# Patient Record
Sex: MALE
[De-identification: names, ages, dates, MRNs, and addresses within clinical notes are randomized per-mention and may not be internally consistent; named-entity substitution may affect disease eponyms.]

---

## 2018-08-15 ENCOUNTER — HOSPITAL ENCOUNTER (INPATIENT)
Dept: HOSPITAL 14 - H.ER | Age: 78
LOS: 5 days | Discharge: HOME | DRG: 603 | End: 2018-08-20
Attending: FAMILY MEDICINE | Admitting: FAMILY MEDICINE
Payer: MEDICARE

## 2018-08-15 DIAGNOSIS — Z86.73: ICD-10-CM

## 2018-08-15 DIAGNOSIS — Z85.46: ICD-10-CM

## 2018-08-15 DIAGNOSIS — F02.81: ICD-10-CM

## 2018-08-15 DIAGNOSIS — G30.9: ICD-10-CM

## 2018-08-15 DIAGNOSIS — I10: ICD-10-CM

## 2018-08-15 DIAGNOSIS — E11.65: ICD-10-CM

## 2018-08-15 DIAGNOSIS — E78.00: ICD-10-CM

## 2018-08-15 DIAGNOSIS — I47.1: ICD-10-CM

## 2018-08-15 DIAGNOSIS — E78.5: ICD-10-CM

## 2018-08-15 DIAGNOSIS — Z87.891: ICD-10-CM

## 2018-08-15 DIAGNOSIS — L03.114: Primary | ICD-10-CM

## 2018-08-15 LAB
APTT BLD: 33 SECONDS (ref 25.6–37.1)
BASE EXCESS BLDV CALC-SCNC: 0.3 MMOL/L (ref 0–2)
BASE EXCESS BLDV CALC-SCNC: 1 MMOL/L (ref 0–2)
BASOPHILS # BLD AUTO: 0 K/UL (ref 0–0.2)
BASOPHILS NFR BLD: 0.5 % (ref 0–2)
BUN SERPL-MCNC: 30 MG/DL (ref 9–20)
CALCIUM SERPL-MCNC: 10.1 MG/DL (ref 8.4–10.2)
EOSINOPHIL # BLD AUTO: 0 K/UL (ref 0–0.7)
EOSINOPHIL NFR BLD: 0.4 % (ref 0–4)
ERYTHROCYTE [DISTWIDTH] IN BLOOD BY AUTOMATED COUNT: 16.2 % (ref 11.5–14.5)
GFR NON-AFRICAN AMERICAN: > 60
HGB BLD-MCNC: 12.2 G/DL (ref 12–18)
INR PPP: 1.2
LYMPHOCYTES # BLD AUTO: 1 K/UL (ref 1–4.3)
LYMPHOCYTES NFR BLD AUTO: 14.1 % (ref 20–40)
MCH RBC QN AUTO: 24.6 PG (ref 27–31)
MCHC RBC AUTO-ENTMCNC: 33 G/DL (ref 33–37)
MCV RBC AUTO: 74.4 FL (ref 80–94)
MONOCYTES # BLD: 0.7 K/UL (ref 0–0.8)
MONOCYTES NFR BLD: 9.9 % (ref 0–10)
NEUTROPHILS # BLD: 5.1 K/UL (ref 1.8–7)
NEUTROPHILS NFR BLD AUTO: 75.1 % (ref 50–75)
NRBC BLD AUTO-RTO: 0.1 % (ref 0–0)
PCO2 BLDV: 42 MMHG (ref 40–60)
PCO2 BLDV: 42 MMHG (ref 40–60)
PH BLDV: 7.39 [PH] (ref 7.32–7.43)
PH BLDV: 7.4 [PH] (ref 7.32–7.43)
PLATELET # BLD: 142 K/UL (ref 130–400)
PMV BLD AUTO: 9.4 FL (ref 7.2–11.7)
PROTHROMBIN TIME: 12.9 SECONDS (ref 9.8–13.1)
RBC # BLD AUTO: 4.98 MIL/UL (ref 4.4–5.9)
URATE SERPL-MCNC: 5.6 MG/DL (ref 3.5–8.5)
VENOUS BLOOD FIO2: 21 %
VENOUS BLOOD FIO2: 21 %
VENOUS BLOOD GAS PO2: 54 MM/HG (ref 30–55)
VENOUS BLOOD GAS PO2: 56 MM/HG (ref 30–55)
WBC # BLD AUTO: 6.8 K/UL (ref 4.8–10.8)

## 2018-08-15 NOTE — ED PDOC
Upper Extremity Pain/Injury


Time Seen by Provider: 08/15/18 17:18


Chief Complaint (Nursing): Upper Extremity Problem/Injury


Chief Complaint (Provider): left arm pain


History Per: Patient, Family (daughter)


History/Exam Limitations: clinical condition (Alzheimer's disease)


Onset/Duration Of Symptoms: Days (x2)


Current Symptoms Are (Timing): Still Present


Additional Complaint(s): 





Burton Burden is an 78 year old male, with a past medical history of advanced 

Alzheimer's, diabetes, HTN and hyperlipidemia, who presents to the emergency 

department accompanied by daughter for a worsening left arm pain onset for x2 

days. Patient's daughter at bedside and states patient was seen by new PMD for 

medication adjustments and shortly after she noticed patient was avoiding using 

his left arm and didn't want it being touch. Daughter states arm has been more 

painful and red for the last x2 days. Patient pulls away with touch but is 

still eating. Daughter denies any fever, chills, behavioral changes, known 

trauma to hand, insect bites or cuts. Patient lives with daughter, Bernadette Burden

, who is also the healthcare proxy. No further medical complaints. 





PMD: Steven Plummer





Past Medical History


Reviewed: Historical Data, Nursing Documentation, Vital Signs


Vital Signs: 


 Last Vital Signs











Temp  99.2 F   08/15/18 17:02


 


Pulse  114 H  08/15/18 17:02


 


Resp  16   08/15/18 17:02


 


BP  188/80 H  08/15/18 17:02


 


Pulse Ox  97   08/15/18 17:02














- Medical History


PMH: Alzheimer's Disease, Dementia, Diabetes, HTN, Hypercholesterolemia, 

Hyperlipidemia


   Denies: Arthritis, Chronic Kidney Disease





- Family History


Family History: States: Unknown Family Hx





- Living Arrangements


Living Arrangements: With Family (daughter)





- Home Medications


Home Medications: 


 Ambulatory Orders











 Medication  Instructions  Recorded


 


Ammonium Lactate 12% [Lac-Hydrin 1 appl TOP DAILY 12/29/16





12% Lotion (225 g)]  


 


Donepezil [Aricept] 10 mg PO DAILY 12/29/16


 


Fenofibrate Nanocrystallized 160 mg PO HS 12/29/16





[Triglide]  


 


Memantine [Namenda] 10 mg PO BID 12/29/16


 


Simvastatin [Zocor] 40 mg PO HS 12/29/16


 


metFORMIN [glucOPHAGE] 500 mg PO DAILY 12/29/16


 


Aspirin [Aspirin Chewable] 81 mg PO DAILY #30 ctb 12/30/16


 


QUEtiapine [SEROquel] 25 mg PO HS 08/15/18














- Allergies


Allergies/Adverse Reactions: 


 Allergies











Allergy/AdvReac Type Severity Reaction Status Date / Time


 


No Known Allergies Allergy   Verified 08/15/18 17:02














Review of Systems


ROS Statement: Except As Marked, All Systems Reviewed And Found Negative


Constitutional: Negative for: Fever, Chills


Musculoskeletal: Positive for: Arm Pain (left arm, redness)


Skin: Negative for: Other (insect bites or cuts)


Neurological: Negative for: Altered Mental Status





Physical Exam





- Reviewed


Nursing Documentation Reviewed: Yes


Vital Signs Reviewed: Yes





- Physical Exam


Appears: Positive for: No Acute Distress


Head Exam: Positive for: ATRAUMATIC, NORMAL INSPECTION, NORMOCEPHALIC


Skin: Positive for: Normal Color, Warm, Dry


Eye Exam: Positive for: Normal appearance, EOMI, PERRL


Neck: Positive for: Painless ROM


Cardiovascular/Chest: Positive for: Regular Rate, Rhythm.  Negative for: Murmur


Respiratory: Positive for: Normal Breath Sounds.  Negative for: Respiratory 

Distress


Gastrointestinal/Abdominal: Positive for: Normal Exam, Soft.  Negative for: 

Tenderness


Back: Positive for: Normal Inspection


Extremity: Positive for: Normal ROM (upper and lower extremities), Swelling (

left hand swelling and erythema to the 1st through 4th digit, worst on the 3rd 

and 4th digit extending to the dorsal hand midway up wrist with streaking seen 

on the anterior forearm. Warm to touch and swelling).  Negative for: Deformity


Neurologic/Psych: Positive for: Alert (minimally responsive )





- Laboratory Results


Result Diagrams: 


 08/15/18 18:45





 08/15/18 18:45





- ECG


O2 Sat by Pulse Oximetry: 97 (RA)


Pulse Ox Interpretation: Normal





Medical Decision Making


Medical Decision Making: 





Time: 17:18


Initial Impression: work up for occult trauma vs infection. Labs sent to r/o 

septic process.





Initial Plan:





--Type and screen


--VBG


--BMP


--Uric acid


--CBC w/ differential


--PTT


--PT


--Naproxen 500 mg PO


--Sodium Chloride 1,000 ml IV 


--Culture blood


--1:1 Observation


--Hand left 3 views [RAD]


--Wrist, left 3 views [RAD] 


--Reevaluation





Pt with cellulitis to left hand/wrist.  Elevated lactate and mild tachycardia.  

Pt given IV fluids and started on vancomycin.  Pain controlled with Naproxen.  

Pt to be admitted under Dr Mora and will be followed by hand surgeon and ID 

on the floor.  Discussed with patient's daughter.





--------------------------------------------------------------------------------

----------------------


Scribe Attestation:


Documented by Barry Bueno acting as a scribe for Zandra Villela MD.


MD Scribe Attestation:


All medical record entries made by the Scribe were at my direction and 

personally dictated by me. I have reviewed the chart and agree that the record 

accurately reflects my personal performance of the history, physical exam, 

medical decision making, and the department course for this patient. I have 

also personally directed, reviewed, and agree with the discharge instructions 

and disposition.





Disposition





- Clinical Impression


Clinical Impression: 


 Cellulitis of arm








- Patient ED Disposition


Is Patient to be Admitted: Yes


Discussed With : Bradley Mora





- Disposition


Disposition Time: 20:15


Condition: GUARDED

## 2018-08-16 RX ADMIN — ENOXAPARIN SODIUM SCH: 40 INJECTION SUBCUTANEOUS at 08:42

## 2018-08-16 RX ADMIN — ENOXAPARIN SODIUM SCH MG: 40 INJECTION SUBCUTANEOUS at 08:31

## 2018-08-16 NOTE — CP.PCM.CON
History of Present Illness





- History of Present Illness


History of Present Illness: 





Consultation for SVT episode in ED 





HPI: 


78-year-old male who past medical history of hypertension diabetes 

hyperlipidemia CVA Alzheimer's disease who was brought brought by his daughter 

to the emergency room for complaint of left forearm pain and reluctant to move 

his left upper extremity patient's was symptoms are getting progressively worse 

for 48 hours prior to presentation in addition, becoming more erythematous and 

tender to palpation.  Patient in the emergency room had an episode of SVT with 

heart rate going to 140s for with IV Cardizem was given subsequently heart rate 

came down into the 90s.  Patient at the time of the respiratory denied having 

any symptoms of palpitations or chest pain.


Past medical history significant for hypertension diabetes hyperlipidemia 

alcala disease past surgical history significant for prostatectomy social 

history quit smoking 25 years ago no history of alcohol or illicit drug use.  

Patient had x-rays of the wrist done which showed no evidence of fracture or 

dislocation he was given naproxen and IV vancomycin in the emergency room 

before the episode of SVT which was subsequently converted with IV Cardizem.





Review of Systems





- Review of Systems


Systems not reviewed;Unavailable: Acuity of Condition





- Constitutional


Constitutional: As Per HPI





- EENT


Eyes: As Per HPI


Ears: As Per HPI


Nose/Mouth/Throat: As Per HPI





- Cardiovascular


Cardiovascular: As Per HPI





- Respiratory


Respiratory: As Per HPI





- Gastrointestinal


Gastrointestinal: As Per HPI





- Genitourinary


Genitourinary: As Per HPI





- Reproductive: Male


Reproductive:Male: As Per HPI





- Musculoskeletal


Musculoskeletal: As Per HPI





- Integumentary


Integumentary: As Per HPI





- Neurological


Neurological: As Per HPI





- Psychiatric


Psychiatric: As Per HPI





- Endocrine


Endocrine: As Per HPI





- Hematologic/Lymphatic


Hematologic: As Per HPI





Past Patient History





- Infectious Disease


Hx of Infectious Diseases: None





- Tetanus Immunizations


Tetanus Immunization: Unknown





- Past Medical History & Family History


Past Medical History?: Yes





- Past Social History


Smoking Status: Former Smoker





- CARDIAC


Hx Cardiac Disorders: Yes


Hx Hypercholesterolemia: Yes


Hx Hypertension: Yes





- PULMONARY


Hx Respiratory Disorders: Yes


Hx Bronchitis: Yes





- NEUROLOGICAL


Hx Neurological Disorder: Yes


Hx Alzheimer's Disease: Yes


HX Cerebrovascular Accident: Yes


Hx Dementia: Yes





- HEENT


Hx HEENT Problems: No





- RENAL


Hx Chronic Kidney Disease: No





- ENDOCRINE/METABOLIC


Hx Endocrine Disorders: Yes


Hx Diabetes Mellitus Type 2: Yes





- HEMATOLOGICAL/ONCOLOGICAL


Hx Blood Disorders: No





- INTEGUMENTARY


Hx Dermatological Problems: Yes


Hx Cellulitis: Yes





- MUSCULOSKELETAL/RHEUMATOLOGICAL


Hx Musculoskeletal Disorders: Yes


Hx Falls: Yes


Other/Comment: Bursitis





- GASTROINTESTINAL


Hx Gastrointestinal Disorders: No





- GENITOURINARY/GYNECOLOGICAL


Hx Genitourinary Disorders: Yes


Hx Prostate Cancer: Yes





- PSYCHIATRIC


Hx Psychophysiologic Disorder: No


Hx Substance Use: No





- SURGICAL HISTORY


Hx Surgeries: Yes


Other/Comment: Prostatectomy





- ANESTHESIA


Hx Anesthesia: Yes


Hx Anesthesia Reactions: No





Meds


Allergies/Adverse Reactions: 


 Allergies











Allergy/AdvReac Type Severity Reaction Status Date / Time


 


No Known Allergies Allergy   Verified 08/15/18 17:02














- Medications


Medications: 


 Current Medications





Aspirin (Aspirin Chewable)  81 mg PO DAILY [facility]


   Last Admin: 08/16/18 08:23 Dose:  81 mg


Atorvastatin Calcium (Lipitor)  20 mg PO HS [facility]


   Last Admin: 08/16/18 21:05 Dose:  20 mg


Donepezil HCl (Aricept)  10 mg PO DAILY [facility]


   Last Admin: 08/16/18 08:23 Dose:  10 mg


Enoxaparin Sodium (Lovenox)  40 mg SC DAILY [facility]


   PRN Reason: Protocol


   Last Admin: 08/16/18 08:42 Dose:  Not Given


Fenofibrate (Tricor)  145 mg PO HS [facility]


   Last Admin: 08/16/18 21:05 Dose:  145 mg


Vancomycin HCl 1 gm/ Sodium (Chloride)  250 mls @ 166.667 mls/hr IVPB DAILY [facility]


   PRN Reason: Protocol


   Last Admin: 08/16/18 08:34 Dose:  166.667 mls/hr


Lactic Acid (Lac-Hydrin 12% Lotion (225 G))  1 applic TOP DAILY [facility]


   Last Admin: 08/16/18 08:27 Dose:  1 applic


Losartan Potassium (Cozaar)  50 mg PO DAILY [facility]


   Last Admin: 08/16/18 13:28 Dose:  50 mg


Memantine (Namenda)  10 mg PO BID [facility]


   Last Admin: 08/16/18 21:05 Dose:  10 mg


Metformin HCl (Glucophage)  500 mg PO DAILY [facility]


   Last Admin: 08/16/18 08:23 Dose:  500 mg


Quetiapine Fumarate (Seroquel)  25 mg PO HS [facility]


   Last Admin: 08/16/18 21:05 Dose:  25 mg


Sitagliptin Phosphate (Januvia)  100 mg PO DAILY [facility]


   Last Admin: 08/16/18 13:28 Dose:  100 mg











Physical Exam





- Constitutional


Appears: Well





- Head Exam


Head Exam: ATRAUMATIC, NORMAL INSPECTION, NORMOCEPHALIC





- Eye Exam


Eye Exam: EOMI, Normal appearance, PERRL


Pupil Exam: NORMAL ACCOMODATION, PERRL





- ENT Exam


ENT Exam: Mucous Membranes Moist, Normal Exam





- Neck Exam


Neck exam: Positive for: Normal Inspection





- Respiratory Exam


Respiratory Exam: Clear to Auscultation Bilateral, NORMAL BREATHING PATTERN





- Cardiovascular Exam


Cardiovascular Exam: REGULAR RHYTHM, +S1, +S2, Systolic Murmur





- GI/Abdominal Exam


GI & Abdominal Exam: Normal Bowel Sounds, Soft.  absent: Tenderness





- Extremities Exam


Extremities exam: Positive for: normal inspection





- Back Exam


Back exam: NORMAL INSPECTION





- Neurological Exam


Neurological exam: Alert, CN II-XII Intact, Normal Gait, Oriented x3, Reflexes 

Normal





- Psychiatric Exam


Psychiatric exam: Normal Affect, Normal Mood





- Skin


Skin Exam: Dry, Intact, Normal Color, Warm





Results





- Vital Signs


Recent Vital Signs: 


 Last Vital Signs











Temp  98.2 F   08/16/18 20:04


 


Pulse  103 H  08/16/18 20:04


 


Resp  20   08/16/18 20:04


 


BP  172/74 H  08/16/18 20:04


 


Pulse Ox  99   08/16/18 20:04














- Labs


Result Diagrams: 


 08/15/18 18:45





 08/15/18 18:45


Labs: 


 Laboratory Results - last 24 hr











  08/15/18 08/16/18 08/16/18





  22:40 05:17 11:24


 


pO2  56 H  


 


VBG pH  7.39  


 


VBG pCO2  42  


 


VBG HCO3  24.9  


 


VBG Total CO2  26.7  


 


VBG O2 Sat (Calc)  87.1 H  


 


VBG Base Excess  0.3  


 


VBG Potassium  4.6  


 


Sodium  135.0  


 


Chloride  105.0  


 


Glucose  348 H  


 


Lactate  1.7  


 


FiO2  21.0  


 


POC Glucose (mg/dL)   265 H  290 H


 


Venous Blood Potassium  4.6  














  08/16/18 08/16/18





  16:09 21:09


 


pO2  


 


VBG pH  


 


VBG pCO2  


 


VBG HCO3  


 


VBG Total CO2  


 


VBG O2 Sat (Calc)  


 


VBG Base Excess  


 


VBG Potassium  


 


Sodium  


 


Chloride  


 


Glucose  


 


Lactate  


 


FiO2  


 


POC Glucose (mg/dL)  258 H  218 H


 


Venous Blood Potassium  














Assessment & Plan


(1) SVT (supraventricular tachycardia)


Assessment and Plan: 


echo 


REBECCA


BB


asa


statins


Status: Acute   





(2) Left arm swelling


Assessment and Plan: 


venous duplex of LUE 





Status: Acute   





(3) Cellulitis


Assessment and Plan: 


abx 


arm elevation 


Status: Acute   





(4) Dementia


Status: Chronic   Priority: Medium   





(5) Hypertension


Status: Chronic   Priority: Medium

## 2018-08-16 NOTE — RAD
Date of service: 



08/15/2018



PROCEDURE:  Left Wrist Radiographs.







HISTORY:

swelling and erythema to left hand



COMPARISON:

None.



FINDINGS:



BONES:

Bone alignment and mineralization are normal.  There is no acute 

displaced fracture or bone destruction.



JOINTS:

Normal. No dislocation. 



SOFT TISSUES:

Normal. 



OTHER FINDINGS:

None.



IMPRESSION:

No acute fracture or dislocation.

## 2018-08-16 NOTE — RAD
PROCEDURE:  Left Hand Radiographs.



HISTORY:

swelling and pain to left hand  



COMPARISON:

None.



FINDINGS:



BONES:

Bone alignment is normal.  There is diffuse bone 

demineralization.There is no acute displaced fracture or bone 

destruction.



JOINTS:

Normal. No osteoarthritic changes. 



SOFT TISSUES:

Normal. 



OTHER FINDINGS:

None.



IMPRESSION:

No acute fracture or dislocation.

## 2018-08-16 NOTE — CP.PCM.HP
Addendum entered and electronically signed by Joseph March MD  08/16/18 16

:31: 





The writer was called by nurse due to tachycardia. SVT present on monitor. 

Vital signs were reviewed, his BP was 163/69, . On interview, pt was 

asymptomatic, stating his body was in perfect state. On physical exam, pt was 

NAD, comfortable resting on bed, pulse was tachycardic, lungs clear to 

auscultation. Meds reviewed. Vasovagal maneuvers such as holding breath, cough 

and gag reflex were tried with no success.  Dr Mora was reached, agreed on 

administering Cardizem 10mg. Pt's HR came down to 90's after a few minutes.


-Consult to cardiology, Dr Duffy was ordered.


-Monitor cardiac tracing. 


-Considering administering another dose of Cardizem 10mg if no resolution with 

first trial. 





[facility] PGY-2 Family Medicine





Original Note:








<Joseph March - Last Filed: 08/16/18 13:25>





History of Present Illness





- History of Present Illness


History of Present Illness: 








79 y/o M with a PMHx of HTN, HLD, DM2, CVA and Alzheimer's disease was brought 

by his daughter to the ED due to Left forearm pain and patient's reluctance to 

move his L upper extremity. Symptoms began 2 days ago and has been 

progressively aggravating. Daughter also noticed that distal forearm was 

getting swollen and becoming more red. Pt does not wants to be touched on L 

arm. As per daughter, no trauma was witnessed , no recent travel or recent ill 

contact. 


-ROS: No fever, no chills, no sweats, no headache, no cough. no nasal congestion

, no chest pain, no SOB, no abdominal pain, no vomiting and no diarrhea.





-NKDA


-PMHx: HTN, HLD, DM 2, CVA and Alzheimer's disease.


-PSHX: Prostatectomy in 2000.


-SHX: Pt quit smoking 35 years ago, no EtOH and no rec drugs. 





At ER:


-PO Naproxen and IV Vancomycin were administered.  


-X-ray of L wrist and hand showed NO dislocation or fracture.





 





Present on Admission





- Present on Admission


Any Indicators Present on Admission: No





Review of Systems





- Constitutional


Constitutional: absent: Anorexia, Chills, Fever





- EENT


Eyes: absent: Change in Vision


Nose/Mouth/Throat: absent: Nasal Congestion, Tongue Swelling, Facial Pain





- Cardiovascular


Cardiovascular: absent: Chest Pain at Rest, Chest Pain with Activity, Orthopnea





- Respiratory


Respiratory: absent: Cough, Hemoptysis, Wheezing





- Gastrointestinal


Gastrointestinal: absent: Abdominal Pain, Hematochezia, Vomiting





- Integumentary


Integumentary: Rash





Past Patient History





- Infectious Disease


Hx of Infectious Diseases: None





- Tetanus Immunizations


Tetanus Immunization: Unknown





- Past Medical History & Family History


Past Medical History?: Yes





- Past Social History


Smoking Status: Former Smoker





- CARDIAC


Hx Cardiac Disorders: Yes


Hx Hypercholesterolemia: Yes


Hx Hypertension: Yes





- PULMONARY


Hx Respiratory Disorders: Yes


Hx Bronchitis: Yes





- NEUROLOGICAL


Hx Neurological Disorder: Yes


Hx Alzheimer's Disease: Yes


HX Cerebrovascular Accident: Yes


Hx Dementia: Yes





- HEENT


Hx HEENT Problems: No





- RENAL


Hx Chronic Kidney Disease: No





- ENDOCRINE/METABOLIC


Hx Endocrine Disorders: Yes


Hx Diabetes Mellitus Type 2: Yes





- HEMATOLOGICAL/ONCOLOGICAL


Hx Blood Disorders: No





- INTEGUMENTARY


Hx Dermatological Problems: Yes


Hx Cellulitis: Yes





- MUSCULOSKELETAL/RHEUMATOLOGICAL


Hx Musculoskeletal Disorders: Yes


Hx Falls: Yes


Other/Comment: Bursitis





- GASTROINTESTINAL


Hx Gastrointestinal Disorders: No





- GENITOURINARY/GYNECOLOGICAL


Hx Genitourinary Disorders: Yes


Hx Prostate Cancer: Yes





- PSYCHIATRIC


Hx Psychophysiologic Disorder: No


Hx Substance Use: No





- SURGICAL HISTORY


Hx Surgeries: Yes


Other/Comment: Prostatectomy





- ANESTHESIA


Hx Anesthesia: Yes


Hx Anesthesia Reactions: No





Meds


Allergies/Adverse Reactions: 


 Allergies











Allergy/AdvReac Type Severity Reaction Status Date / Time


 


No Known Allergies Allergy   Verified 08/15/18 17:02














Physical Exam





- Constitutional


Appears: No Acute Distress





- Head Exam


Head Exam: ATRAUMATIC, NORMAL INSPECTION





- Eye Exam


Eye Exam: EOMI, Normal appearance





- Neck Exam


Neck exam: Positive for: Full Rom.  Negative for: Lymphadenopathy, Meningismus





- Respiratory Exam


Respiratory Exam: Clear to Auscultation Bilateral, NORMAL BREATHING PATTERN





- Cardiovascular Exam


Cardiovascular Exam: +S1, +S2





- GI/Abdominal Exam


GI & Abdominal Exam: Soft.  absent: Distended, Guarding, Tenderness





- Extremities Exam


Extremities exam: Positive for: full ROM.  Negative for: calf tenderness





- Neurological Exam


Neurological exam: Alert





- Skin


Additional comments: 





Left upper extremity: presence of tenderness, swelling and mild erythema on 

distal forearm and wrist. No trace of insect bite or suppuration. 








Results





- Vital Signs


Recent Vital Signs: 





 Last Vital Signs











Temp  98.9 F   08/16/18 12:00


 


Pulse  101 H  08/16/18 12:00


 


Resp  18   08/16/18 12:00


 


BP  142/78   08/16/18 12:00


 


Pulse Ox  98   08/16/18 12:00














- Labs


Result Diagrams: 


 08/15/18 18:45





 08/15/18 18:45


Labs: 





 Laboratory Results - last 24 hr











  08/15/18 08/15/18 08/15/18





  18:45 18:45 18:45


 


WBC   6.8 


 


RBC   4.98 


 


Hgb   12.2  D 


 


Hct   37.0 


 


MCV   74.4 L D 


 


MCH   24.6 L 


 


MCHC   33.0 


 


RDW   16.2 H 


 


Plt Count   142 


 


MPV   9.4 


 


Neut % (Auto)   75.1 H 


 


Lymph % (Auto)   14.1 L 


 


Mono % (Auto)   9.9 


 


Eos % (Auto)   0.4 


 


Baso % (Auto)   0.5 


 


Neut # (Auto)   5.1 


 


Lymph # (Auto)   1.0 


 


Mono # (Auto)   0.7 


 


Eos # (Auto)   0.0 


 


Baso # (Auto)   0.0 


 


PT   


 


INR   


 


APTT   


 


pO2   


 


VBG pH   


 


VBG pCO2   


 


VBG HCO3   


 


VBG Total CO2   


 


VBG O2 Sat (Calc)   


 


VBG Base Excess   


 


VBG Potassium   


 


Glucose   


 


Lactate   


 


FiO2   


 


Sodium  139  


 


Potassium  5.0  


 


Chloride  102  


 


Carbon Dioxide  23  


 


Anion Gap  19  


 


BUN  30 H  


 


Creatinine  1.0  


 


Est GFR ( Amer)  > 60  


 


Est GFR (Non-Af Amer)  > 60  


 


POC Glucose (mg/dL)   


 


Random Glucose  343 H  


 


Uric Acid  5.6  


 


Calcium  10.1  


 


Venous Blood Potassium   


 


Blood Type    A POSITIVE


 


Antibody Screen    Negative


 


BBK History Checked    Patient has bt














  08/15/18 08/15/18 08/15/18





  18:45 19:15 22:40


 


WBC   


 


RBC   


 


Hgb   


 


Hct   


 


MCV   


 


MCH   


 


MCHC   


 


RDW   


 


Plt Count   


 


MPV   


 


Neut % (Auto)   


 


Lymph % (Auto)   


 


Mono % (Auto)   


 


Eos % (Auto)   


 


Baso % (Auto)   


 


Neut # (Auto)   


 


Lymph # (Auto)   


 


Mono # (Auto)   


 


Eos # (Auto)   


 


Baso # (Auto)   


 


PT  12.9  


 


INR  1.2  


 


APTT  33.0  


 


pO2   54  56 H


 


VBG pH   7.40  7.39


 


VBG pCO2   42  42


 


VBG HCO3   25.4  24.9


 


VBG Total CO2   27.3  26.7


 


VBG O2 Sat (Calc)   86.7 H  87.1 H


 


VBG Base Excess   1.0  0.3


 


VBG Potassium   4.9  4.6


 


Glucose   364 H  348 H


 


Lactate   2.7 H  1.7


 


FiO2   21.0  21.0


 


Sodium   135.0  135.0


 


Potassium   


 


Chloride   102.0  105.0


 


Carbon Dioxide   


 


Anion Gap   


 


BUN   


 


Creatinine   


 


Est GFR ( Amer)   


 


Est GFR (Non-Af Amer)   


 


POC Glucose (mg/dL)   


 


Random Glucose   


 


Uric Acid   


 


Calcium   


 


Venous Blood Potassium   4.9  4.6


 


Blood Type   


 


Antibody Screen   


 


BBK History Checked   














  08/16/18 08/16/18





  05:17 11:24


 


WBC  


 


RBC  


 


Hgb  


 


Hct  


 


MCV  


 


MCH  


 


MCHC  


 


RDW  


 


Plt Count  


 


MPV  


 


Neut % (Auto)  


 


Lymph % (Auto)  


 


Mono % (Auto)  


 


Eos % (Auto)  


 


Baso % (Auto)  


 


Neut # (Auto)  


 


Lymph # (Auto)  


 


Mono # (Auto)  


 


Eos # (Auto)  


 


Baso # (Auto)  


 


PT  


 


INR  


 


APTT  


 


pO2  


 


VBG pH  


 


VBG pCO2  


 


VBG HCO3  


 


VBG Total CO2  


 


VBG O2 Sat (Calc)  


 


VBG Base Excess  


 


VBG Potassium  


 


Glucose  


 


Lactate  


 


FiO2  


 


Sodium  


 


Potassium  


 


Chloride  


 


Carbon Dioxide  


 


Anion Gap  


 


BUN  


 


Creatinine  


 


Est GFR ( Amer)  


 


Est GFR (Non-Af Amer)  


 


POC Glucose (mg/dL)  265 H  290 H


 


Random Glucose  


 


Uric Acid  


 


Calcium  


 


Venous Blood Potassium  


 


Blood Type  


 


Antibody Screen  


 


BBK History Checked  














Assessment & Plan





- Assessment and Plan (Free Text)


Assessment: 








79 y/o M with a PMHx of HTN, DM 2, HLD, CVA and Alzheimer's disease admitted 

for evaluation and management of L distal forearm tenderness, edema and pain. 

Evaluating trauma, cellulitis or possibility of DVT. XR of L wrist and L hand 

are unremarkable. 








--US doppler of L upper extremity ordered.


--F/U ESR, blood cultures, 


--Home medications resumed.


--Continue Vancomycin IV daily. 


--Since BP was elevated overnight, Losartan 50mg was initiated.


--Serum glucose currently not controlled, Januvia 100mg PO was added. 


--CMP, Lipid panel and HbA1c were ordered. F/U results. 


--Lovenox for DVT prophylaxis.


--On heart health diet. 


--1:1 observation as patient became aggressive last night and given Haldol. 











- Date & Time


Date: 08/16/18


Time: 12:00





<Bradley Mora - Last Filed: 08/19/18 19:32>





Results





- Vital Signs


Recent Vital Signs: 





 Last Vital Signs











Temp  97.6 F   08/19/18 17:13


 


Pulse  92 H  08/19/18 17:13


 


Resp  18   08/19/18 17:13


 


BP  162/79 H  08/19/18 17:13


 


Pulse Ox  96   08/19/18 17:13














- Labs


Result Diagrams: 


 08/15/18 18:45





 08/15/18 18:45





Assessment & Plan


(1) Cellulitis of arm


Status: Acute   





(2) SVT (supraventricular tachycardia)


Status: Acute   





(3) DM2 (diabetes mellitus, type 2)


Status: Chronic   





(4) Dementia


Status: Chronic   Priority: Medium   





(5) Hypertension


Status: Chronic   Priority: Medium   





- Assessment and Plan (Free Text)


Plan: 





I was present during evaluation and discussed with Dr March re plans of 

care and mgt.





Bradley Mora M.D.

## 2018-08-16 NOTE — CARD
--------------- APPROVED REPORT --------------





Date of service: 08/15/2018



<Conclusion>

Sinus tachycardia

Otherwise normal ECG

## 2018-08-16 NOTE — US
Date of service: 



08/16/2018



PROCEDURE:  Left Upper Extremity Venous Doppler



HISTORY:

swelling tenderness



COMPARISON:

None available. 



TECHNIQUE:

Left upper extremity deep veins, including the lower internal jugular,

 subclavian, axillary and brachial veins, were evaluated flow, 

compressibility and respiratory phasicity. 



FINDINGS:

Normal flow, compressibility and respiratory phasicity was observed 

in the the left upper extremity deep veins. 



IMPRESSION:

No evidence of deep venous thrombosis.

## 2018-08-17 LAB — BNP SERPL-MCNC: 949 PG/ML (ref 0–900)

## 2018-08-17 RX ADMIN — METOPROLOL SUCCINATE SCH MG: 25 TABLET, EXTENDED RELEASE ORAL at 12:10

## 2018-08-17 RX ADMIN — ENOXAPARIN SODIUM SCH: 40 INJECTION SUBCUTANEOUS at 08:33

## 2018-08-17 RX ADMIN — INSULIN LISPRO SCH UNITS: 100 INJECTION, SOLUTION INTRAVENOUS; SUBCUTANEOUS at 13:30

## 2018-08-17 NOTE — CP.PCM.PN
Subjective





- Date & Time of Evaluation


Date of Evaluation: 08/17/18


Time of Evaluation: 17:42





- Subjective


Subjective: 





echo reviewed








Objective





- Vital Signs/Intake and Output


Vital Signs (last 24 hours): 


 











Temp Pulse Resp BP Pulse Ox


 


 97.9 F   72   18   135/75   97 


 


 08/17/18 04:29  08/17/18 04:29  08/17/18 04:29  08/17/18 04:29  08/17/18 04:29











- Medications


Medications: 


 Current Medications





Aspirin (Aspirin Chewable)  81 mg PO DAILY [facility]


   Last Admin: 08/16/18 08:23 Dose:  81 mg


Atorvastatin Calcium (Lipitor)  20 mg PO HS [facility]


   Last Admin: 08/16/18 21:05 Dose:  20 mg


Donepezil HCl (Aricept)  10 mg PO DAILY [facility]


   Last Admin: 08/16/18 08:23 Dose:  10 mg


Enoxaparin Sodium (Lovenox)  40 mg SC DAILY [facility]


   PRN Reason: Protocol


   Last Admin: 08/16/18 08:42 Dose:  Not Given


Fenofibrate (Tricor)  145 mg PO HS [facility]


   Last Admin: 08/16/18 21:05 Dose:  145 mg


Vancomycin HCl 1 gm/ Sodium (Chloride)  250 mls @ 166.667 mls/hr IVPB DAILY [facility]


   PRN Reason: Protocol


   Last Admin: 08/16/18 08:34 Dose:  166.667 mls/hr


Lactic Acid (Lac-Hydrin 12% Lotion (225 G))  1 applic TOP DAILY [facility]


   Last Admin: 08/16/18 08:27 Dose:  1 applic


Losartan Potassium (Cozaar)  50 mg PO DAILY [facility]


   Last Admin: 08/16/18 13:28 Dose:  50 mg


Memantine (Namenda)  10 mg PO BID [facility]


   Last Admin: 08/16/18 21:05 Dose:  10 mg


Metformin HCl (Glucophage)  500 mg PO DAILY [facility]


   Last Admin: 08/16/18 08:23 Dose:  500 mg


Metoprolol Succinate (Toprol Xl)  25 mg PO DAILY [facility]


Quetiapine Fumarate (Seroquel)  25 mg PO HS [facility]


   Last Admin: 08/16/18 21:05 Dose:  25 mg


Sitagliptin Phosphate (Januvia)  100 mg PO DAILY [facility]


   Last Admin: 08/16/18 13:28 Dose:  100 mg











- Labs


Labs: 


 





 08/15/18 18:45 





 08/15/18 18:45 





 











PT  12.9 Seconds (9.8-13.1)   08/15/18  18:45    


 


INR  1.2   08/15/18  18:45    


 


APTT  33.0 Seconds (25.6-37.1)   08/15/18  18:45    














- Constitutional


Appears: Well





- Head Exam


Head Exam: ATRAUMATIC, NORMAL INSPECTION, NORMOCEPHALIC





- Eye Exam


Eye Exam: EOMI, Normal appearance, PERRL


Pupil Exam: NORMAL ACCOMODATION, PERRL





- ENT Exam


ENT Exam: Mucous Membranes Moist, Normal Exam





- Neck Exam


Neck Exam: Full ROM, Normal Inspection.  absent: Lymphadenopathy





- Respiratory Exam


Respiratory Exam: Clear to Ausculation Bilateral, NORMAL BREATHING PATTERN





- Cardiovascular Exam


Cardiovascular Exam: REGULAR RHYTHM, +S1, +S2.  absent: Murmur





- GI/Abdominal Exam


GI & Abdominal Exam: Soft, Normal Bowel Sounds.  absent: Tenderness





- Extremities Exam


Extremities Exam: Full ROM, Normal Capillary Refill, Normal Inspection.  absent

: Joint Swelling, Pedal Edema





- Back Exam


Back Exam: NORMAL INSPECTION





- Neurological Exam


Neurological Exam: Alert, Awake, CN II-XII Intact, Normal Gait, Oriented x3





- Psychiatric Exam


Psychiatric exam: Normal Affect, Normal Mood





- Skin


Skin Exam: Dry, Intact, Normal Color, Warm





Assessment and Plan


(1) SVT (supraventricular tachycardia)


Status: Acute   





(2) Left arm swelling


Status: Acute   





(3) Cellulitis


Status: Acute   





(4) Dementia


Status: Chronic   





(5) Hypertension


Status: Chronic

## 2018-08-17 NOTE — CP.PCM.PN
<Joseph Macrh - Last Filed: 08/17/18 14:19>





Subjective





- Date & Time of Evaluation


Date of Evaluation: 08/17/18


Time of Evaluation: 10:05





- Subjective


Subjective: 





79 y/o M is resting comfortably on bed. Pt reports feeling well. Pt denies 

chest pain, SOB, nausea, vomiting, changein bowel movement or rash. Daughter by 

bedside reports that patient has had tachycardia episodes for quite some time 

but never seen by cardiologist. 








Objective





- Vital Signs/Intake and Output


Vital Signs (last 24 hours): 


 











Temp Pulse Resp BP Pulse Ox


 


 97.4 F L  84   18   153/72 H  97 


 


 08/17/18 11:47  08/17/18 12:10  08/17/18 11:47  08/17/18 12:10  08/17/18 14:15











- Medications


Medications: 


 Current Medications





Aspirin (Aspirin Chewable)  81 mg PO DAILY [facility]


   Last Admin: 08/17/18 08:31 Dose:  81 mg


Atorvastatin Calcium (Lipitor)  20 mg PO HS [facility]


   Last Admin: 08/16/18 21:05 Dose:  20 mg


Dextrose (Dextrose 50% Inj)  0 ml IV STAT PRN; Protocol


   PRN Reason: Hypoglycemia Protocol


Dextrose (Glutose 15)  0 gm PO ONCE PRN; Protocol


   PRN Reason: Hypoglycemia Protocol


Donepezil HCl (Aricept)  10 mg PO DAILY [facility]


   Last Admin: 08/17/18 08:32 Dose:  10 mg


Enoxaparin Sodium (Lovenox)  40 mg SC DAILY [facility]


   PRN Reason: Protocol


   Last Admin: 08/17/18 08:33 Dose:  Not Given


Fenofibrate (Tricor)  145 mg PO HS [facility]


   Last Admin: 08/16/18 21:05 Dose:  145 mg


Glucagon (Glucagen Diagnostic Kit)  0 mg IM STAT PRN; Protocol


   PRN Reason: Hypoglycemia Protocol


Cefazolin Sodium 1 gm/ Sodium (Chloride)  100 mls @ 100 mls/hr IVPB Q12 [facility]


   PRN Reason: Protocol


   Last Admin: 08/17/18 13:40 Dose:  100 mls/hr


Insulin Human Lispro (Humalog)  0 units SC UPON ADM [facility]


   PRN Reason: Protocol


Lactic Acid (Lac-Hydrin 12% Lotion (225 G))  1 applic TOP DAILY [facility]


   Last Admin: 08/17/18 08:32 Dose:  1 applic


Losartan Potassium (Cozaar)  50 mg PO DAILY [facility]


   Last Admin: 08/17/18 09:53 Dose:  50 mg


Memantine (Namenda)  10 mg PO BID [facility]


   Last Admin: 08/17/18 08:31 Dose:  10 mg


Metformin HCl (Glucophage)  500 mg PO DAILY [facility]


   Last Admin: 08/17/18 08:30 Dose:  500 mg


Metoprolol Succinate (Toprol Xl)  25 mg PO DAILY [facility]


   Last Admin: 08/17/18 12:10 Dose:  25 mg


Quetiapine Fumarate (Seroquel)  25 mg PO HS [facility]


   Last Admin: 08/16/18 21:05 Dose:  25 mg


Sitagliptin Phosphate (Januvia)  100 mg PO DAILY [facility]


   Last Admin: 08/17/18 08:31 Dose:  100 mg











- Labs


Labs: 


 





 08/15/18 18:45 





 08/15/18 18:45 





 











PT  12.9 Seconds (9.8-13.1)   08/15/18  18:45    


 


INR  1.2   08/15/18  18:45    


 


APTT  33.0 Seconds (25.6-37.1)   08/15/18  18:45    














- Constitutional


Appears: Well, No Acute Distress





- Head Exam


Head Exam: ATRAUMATIC, NORMAL INSPECTION





- Eye Exam


Eye Exam: EOMI





- ENT Exam


ENT Exam: Mucous Membranes Moist





- Neck Exam


Neck Exam: Full ROM.  absent: Meningismus





- Respiratory Exam


Respiratory Exam: Clear to Ausculation Bilateral, NORMAL BREATHING PATTERN





- Cardiovascular Exam


Cardiovascular Exam: REGULAR RHYTHM, +S1, +S2





- GI/Abdominal Exam


GI & Abdominal Exam: Soft, Normal Bowel Sounds.  absent: Guarding, Tenderness





- Extremities Exam


Extremities Exam: Normal Inspection.  absent: Calf Tenderness





- Neurological Exam


Neurological Exam: Alert, Awake





Assessment and Plan





- Assessment and Plan (Free Text)


Assessment: 








79 y/o M with a PMHx of HTN, DM 2, HLD, CVA and Alzheimer's disease admitted 

for evaluation and management of Left hand cellulitis. And 


 now being evaluated for Supraventricular Tachycardia, narrow complex. 





--US doppler of L upper extremity was negative for thrombus.


--Blood cultures negative x4. 


--Home medications resumed.


--ID, Dr Solorio, consulted


--Ancef IV 1gr daily. 


--On Januvia 100mg PO 


--CMP, Lipid panel and HbA1c were ordered. F/U results. 


--Cardiology, Dr Duffy on board. 


--Echocardiogram today. 


--Lovenox for DVT prophylaxis.


--On heart health diet. 


--1:1 observation.











<Bradley Mora - Last Filed: 08/19/18 19:33>





Objective





- Vital Signs/Intake and Output


Vital Signs (last 24 hours): 


 











Temp Pulse Resp BP Pulse Ox


 


 97.6 F   92 H  18   162/79 H  96 


 


 08/19/18 17:13  08/19/18 17:13  08/19/18 17:13  08/19/18 17:13  08/19/18 17:13











- Medications


Medications: 


 Current Medications





Amlodipine Besylate (Norvasc)  5 mg PO DAILY [facility]


   Last Admin: 08/19/18 09:16 Dose:  5 mg


Aspirin (Aspirin Chewable)  81 mg PO DAILY [facility]


   Last Admin: 08/19/18 09:17 Dose:  81 mg


Atorvastatin Calcium (Lipitor)  20 mg PO HS [facility]


   Last Admin: 08/18/18 21:24 Dose:  20 mg


Dextrose (Dextrose 50% Inj)  0 ml IV STAT PRN; Protocol


   PRN Reason: Hypoglycemia Protocol


Dextrose (Glutose 15)  0 gm PO ONCE PRN; Protocol


   PRN Reason: Hypoglycemia Protocol


Donepezil HCl (Aricept)  10 mg PO DAILY [facility]


   Last Admin: 08/19/18 09:17 Dose:  10 mg


Fenofibrate (Tricor)  145 mg PO HS [facility]


   Last Admin: 08/18/18 21:24 Dose:  145 mg


Glipizide (Glucotrol Xl)  5 mg PO BRK [facility]


   Last Admin: 08/19/18 09:17 Dose:  5 mg


Glucagon (Glucagen Diagnostic Kit)  0 mg IM STAT PRN; Protocol


   PRN Reason: Hypoglycemia Protocol


Cefazolin Sodium 1 gm/ Sodium (Chloride)  100 mls @ 100 mls/hr IVPB Q8 [facility]


   PRN Reason: Protocol


   Last Admin: 08/19/18 17:03 Dose:  100 mls/hr


Insulin Detemir (Levemir)  10 units SC HS [facility]


Insulin Human Lispro (Humalog)  0 units SC UPON ADM [facility]


   PRN Reason: Protocol


   Last Admin: 08/19/18 12:55 Dose:  2 units


Lactic Acid (Lac-Hydrin 12% Lotion (225 G))  1 applic TOP DAILY [facility]


   Last Admin: 08/19/18 09:15 Dose:  1 applic


Losartan Potassium (Cozaar)  100 mg PO DAILY [facility]


   Last Admin: 08/19/18 09:18 Dose:  100 mg


Memantine (Namenda)  10 mg PO BID [facility]


   Last Admin: 08/19/18 17:05 Dose:  10 mg


Metformin HCl (Glucophage)  500 mg PO BIDWM [facility]


   Last Admin: 08/19/18 17:05 Dose:  500 mg


Metoprolol Tartrate (Lopressor)  50 mg PO Q12 [facility]


Quetiapine Fumarate (Seroquel)  25 mg PO HS [facility]


   Last Admin: 08/18/18 21:24 Dose:  25 mg


Sitagliptin Phosphate (Januvia)  100 mg PO DAILY [facility]


   Last Admin: 08/19/18 09:15 Dose:  100 mg











- Labs


Labs: 


 





 08/15/18 18:45 





 08/15/18 18:45 





 











PT  12.9 Seconds (9.8-13.1)   08/15/18  18:45    


 


INR  1.2   08/15/18  18:45    


 


APTT  33.0 Seconds (25.6-37.1)   08/15/18  18:45    














Assessment and Plan


(1) Cellulitis of arm


Status: Acute   





(2) SVT (supraventricular tachycardia)


Status: Acute   





(3) DM2 (diabetes mellitus, type 2)


Status: Chronic   





(4) Dementia


Status: Chronic   





(5) Hypertension


Status: Chronic   





- Assessment and Plan (Free Text)


Plan: 





I was present during evaluation and discussed with Dr March re plans of 

care and mgt.





Bradley Mora M.D.

## 2018-08-17 NOTE — CP.PCM.CON
History of Present Illness





- History of Present Illness


History of Present Illness: 





78 year old male, with a past medical history of advanced Alzheimer's, diabetes

, HTN and hyperlipidemia, who presents to the emergency department accompanied 

by daughter for a worsening left arm pain onset for x2 days.  Daughter states 

arm has been more painful and red for the last x2 days. Patient pulls away with 

touch but is still eating.





ID consulted for cellulitis LUE 





- Medical History


PMH: Alzheimer's Disease, Dementia, Diabetes, HTN, Hypercholesterolemia, 

Hyperlipidemia


   Denies: Arthritis, Chronic Kidney Disease








Review of Systems





- Review of Systems


Systems not reviewed;Unavailable: Altered Mental Status


All systems: reviewed and no additional remarkable complaints except





- Constitutional


Constitutional: As Per HPI





- EENT


Eyes: absent: As Per HPI, Blind Spots, Blurred Vision, Change in Vision, 

Decreased Night Vision, Diplopia, Discharge, Dry Eye, Exophthalmos, Floaters, 

Irritation, Itchy Eyes, Loss of Peripheral Vision, Pain, Photophobia, Requires 

Corrective Lenses, Sees Flashes, Spots in Vision, Tunnel Vision, Other Visual 

Disturbances, Loss of Vision, Other


Ears: absent: As Per HPI, Decreased Hearing, Ear Discharge, Ear Pain, Tinnitus, 

Abnormal Hearing, Disequilibrium, Dizziness, Other


Nose/Mouth/Throat: absent: As Per HPI, Epistaxis, Nasal Congestion, Nasal 

Discharge, Nasal Obstruction, Nasal Trauma, Nose Pain, Post Nasal Drip, Sinus 

Pain, Sinus Pressure, Bleeding Gums, Change in Voice, Dental Pain, Dry Mouth, 

Dysphagia, Halitosis, Hoarsness, Lip Swelling, Mouth Lesions, Mouth Pain, 

Odynophagia, Sore Throat, Throat Swelling, Tongue Swelling, Facial Pain, Neck 

Pain, Neck Mass, Other





- Cardiovascular


Cardiovascular: absent: As Per HPI, Acrocyanosis, Chest Pain, Chest Pain at Rest

, Chest Pain with Activity, Claudication, Diaphoresis, Dyspnea, Dyspnea on 

Exertion, Edema, Irregular Heart Rhythm, Pain Radiating to Arm/Neck/Jaw, Leg 

Edema, Leg Ulcers, Lightheadedness, Orthopnea, Palpitations, Paroxysmal 

Nocturnal Dyspnea, Pedal Edema, Radiating Pain, Rapid Heart Rate, Slow Heart 

Rate, Syncope, Other





- Respiratory


Respiratory: absent: As Per HPI, Cough, Dyspnea, Hemoptysis, Dyspnea on Exertion

, Wheezing, Snoring, Stridor, Pain on Inspiration, Chest Congestion, Excessive 

Mucous Production, Change in Mucous Color, Pain with Coughing, Other





- Gastrointestinal


Gastrointestinal: absent: As Per HPI, Abdominal Pain, Belching, Bloating, 

Change in Bowel Habits, Change in Stool Character, Coffee Ground Emesis, 

Constipation, Cramping, Diarrhea, Dyspepsia, Dysphagia, Early Satiety, 

Excessive Flatus, Fecal Incontinence, Heartburn, Hematemesis, Hematochezia, 

Loose Stools, Melena, Nausea, Odynophagia, Temesmus, Vomiting, Other





- Genitourinary


Genitourinary: absent: As Per HPI, Change in Urinary Stream, Difficulty 

Urinating, Dysuria, Flank Pain, Hematuria, Pyuria, Nocturia, Urinary 

Incontinence, Urinary Frequency, Urinary Hesitance, Urinary Urgency, Voiding 

Freq/Small Amts, Freq UTI, Hx Renal/Bladder Calculi, Hx /Renal Surgery, 

Bladder Distension, Other





- Musculoskeletal


Musculoskeletal: As Per HPI





- Integumentary


Integumentary: As Per HPI





- Neurological


Neurological: absent: As Per HPI, Abnormal Gait, Abnormal Hearing, Abnormal 

Movements, Abnormal Speech, Behavioral Changes, Burning Sensations, Confusion, 

Convulsions, Disequilibrium, Dizziness, Numbness, Focal Weakness, Frequent Falls

, Headaches, Lack of Coordination, Loss of Vision, Memory Loss, Paresthesias, 

Radicular Pain, Restless Legs, Sensory Deficit, Syncope, Tingling, Tremor, 

Vertigo, Weakness, Other Visual Disturbances, Other





- Psychiatric


Psychiatric: absent: As Per HPI, Abnormal Sleep Pattern, Anhedonia, Anxiety, 

Auditory Hallucinations, Behavioral Changes, Change in Appetite, Change in 

Libido, Confusion, Depression, Difficulty Concentrating, Hallucinations, 

Homicidal Ideation, Hopelessness, Irritability, Memory Loss, Mood Swings, Panic 

Attacks, Paranoia, Suicidal Ideation, Visual Hallucinations, Tactile 

Hallucinations, Other





- Endocrine


Endocrine: absent: As Per HPI, Change in Body Appearance, Change in Libido, 

Cold Intolorance, Deepening of Voice, Excessive Sweating, Fatigue, Flushing, 

Heat Intolorance, Increase in Ring/Shoe/Hat Size, Palpitations, Polydipsia, 

Polyphagia, Polyuria, Other





- Hematologic/Lymphatic


Hematologic: absent: As Per HPI, Easy Bleeding, Easy Bruising, Lymphadenopathy, 

Other





Past Patient History





- Infectious Disease


Hx of Infectious Diseases: None





- Tetanus Immunizations


Tetanus Immunization: Unknown





- Past Medical History & Family History


Past Medical History?: Yes





- Past Social History


Smoking Status: Former Smoker





- CARDIAC


Hx Hypercholesterolemia: Yes


Hx Hypertension: Yes





- PULMONARY


Hx Respiratory Disorders: Yes


Hx Bronchitis: Yes





- NEUROLOGICAL


Hx Alzheimer's Disease: Yes


Hx Dementia: Yes





- HEENT


Hx HEENT Problems: No





- RENAL


Hx Chronic Kidney Disease: No





- ENDOCRINE/METABOLIC


Hx Endocrine Disorders: Yes


Hx Diabetes Mellitus Type 2: Yes





- HEMATOLOGICAL/ONCOLOGICAL


Hx Blood Disorders: No





- INTEGUMENTARY


Hx Dermatological Problems: Yes


Hx Cellulitis: Yes





- MUSCULOSKELETAL/RHEUMATOLOGICAL


Hx Arthritis: No





- GASTROINTESTINAL


Hx Gastrointestinal Disorders: No





- GENITOURINARY/GYNECOLOGICAL


Hx Genitourinary Disorders: Yes


Hx Prostate Cancer: Yes





- PSYCHIATRIC


Hx Psychophysiologic Disorder: No


Hx Substance Use: No





- SURGICAL HISTORY


Hx Surgeries: Yes


Other/Comment: Prostatectomy





- ANESTHESIA


Hx Anesthesia: Yes


Hx Anesthesia Reactions: No





Meds


Allergies/Adverse Reactions: 


 Allergies











Allergy/AdvReac Type Severity Reaction Status Date / Time


 


No Known Allergies Allergy   Verified 08/15/18 17:02














- Medications


Medications: 


 Current Medications





Aspirin (Aspirin Chewable)  81 mg PO DAILY [facility]


   Last Admin: 08/17/18 08:31 Dose:  81 mg


Atorvastatin Calcium (Lipitor)  20 mg PO HS [facility]


   Last Admin: 08/16/18 21:05 Dose:  20 mg


Dextrose (Dextrose 50% Inj)  0 ml IV STAT PRN; Protocol


   PRN Reason: Hypoglycemia Protocol


Dextrose (Glutose 15)  0 gm PO ONCE PRN; Protocol


   PRN Reason: Hypoglycemia Protocol


Donepezil HCl (Aricept)  10 mg PO DAILY [facility]


   Last Admin: 08/17/18 08:32 Dose:  10 mg


Enoxaparin Sodium (Lovenox)  40 mg SC DAILY [facility]


   PRN Reason: Protocol


   Last Admin: 08/17/18 08:33 Dose:  Not Given


Fenofibrate (Tricor)  145 mg PO HS [facility]


   Last Admin: 08/16/18 21:05 Dose:  145 mg


Glucagon (Glucagen Diagnostic Kit)  0 mg IM STAT PRN; Protocol


   PRN Reason: Hypoglycemia Protocol


Cefazolin Sodium 1 gm/ Sodium (Chloride)  100 mls @ 100 mls/hr IVPB Q12 [facility]


   PRN Reason: Protocol


   Last Admin: 08/17/18 13:40 Dose:  100 mls/hr


Insulin Human Lispro (Humalog)  0 units SC UPON ADM [facility]


   PRN Reason: Protocol


   Last Admin: 08/17/18 13:30 Dose:  4 units


Lactic Acid (Lac-Hydrin 12% Lotion (225 G))  1 applic TOP DAILY [facility]


   Last Admin: 08/17/18 08:32 Dose:  1 applic


Losartan Potassium (Cozaar)  50 mg PO DAILY [facility]


   Last Admin: 08/17/18 09:53 Dose:  50 mg


Memantine (Namenda)  10 mg PO BID [facility]


   Last Admin: 08/17/18 16:58 Dose:  10 mg


Metformin HCl (Glucophage)  500 mg PO DAILY [facility]


   Last Admin: 08/17/18 08:30 Dose:  500 mg


Metoprolol Succinate (Toprol Xl)  25 mg PO DAILY [facility]


   Last Admin: 08/17/18 12:10 Dose:  25 mg


Quetiapine Fumarate (Seroquel)  25 mg PO HS [facility]


   Last Admin: 08/16/18 21:05 Dose:  25 mg


Sitagliptin Phosphate (Januvia)  100 mg PO DAILY [facility]


   Last Admin: 08/17/18 08:31 Dose:  100 mg











Physical Exam





- Constitutional


Appears: Non-toxic, Chronically Ill





- Head Exam


Head Exam: NORMOCEPHALIC





- Eye Exam


Eye Exam: PERRL.  absent: Scleral icterus





- ENT Exam


ENT Exam: Mucous Membranes Dry, Normal External Ear Exam





- Neck Exam


Neck exam: Negative for: Lymphadenopathy





- Respiratory Exam


Respiratory Exam: Decreased Breath Sounds, Clear to Auscultation Bilateral





- Cardiovascular Exam


Cardiovascular Exam: REGULAR RHYTHM, +S1, +S2





- GI/Abdominal Exam


GI & Abdominal Exam: Diminished Bowel Sounds, Soft.  absent: Tenderness





- Rectal Exam


Rectal Exam: Deferred





-  Exam


 Exam: NORMAL INSPECTION





- Extremities Exam


Extremities exam: Positive for: tenderness, pedal pulses present.  Negative for

: calf tenderness, pedal edema





- Back Exam


Back exam: absent: CVA tenderness (L), CVA tenderness (R)





- Neurological Exam


Neurological exam: Alert, Altered, CN II-XII Intact





- Psychiatric Exam


Psychiatric exam: Depressed





- Skin


Skin Exam: Dry





Results





- Vital Signs


Recent Vital Signs: 


 Last Vital Signs











Temp  99.2 F   08/17/18 19:43


 


Pulse  84   08/17/18 19:43


 


Resp  20   08/17/18 19:43


 


BP  148/66   08/17/18 19:43


 


Pulse Ox  99   08/17/18 19:43














- Labs


Result Diagrams: 


 08/15/18 18:45





 08/15/18 18:45


Labs: 


 Laboratory Results - last 24 hr











  08/16/18 08/17/18 08/17/18





  21:09 04:37 06:05


 


POC Glucose (mg/dL)  218 H  171 H 


 


Troponin I    < 0.0120


 


NT-Pro-B Natriuret Pep    949 H














  08/17/18 08/17/18





  11:22 16:27


 


POC Glucose (mg/dL)  265 H  178 H


 


Troponin I  


 


NT-Pro-B Natriuret Pep  














Assessment & Plan


(1) Cellulitis of arm


Status: Acute   


Comment: start empiric iv rx  as ordered   





(2) Left arm swelling


Status: Acute   





(3) SVT (supraventricular tachycardia)


Status: Acute

## 2018-08-18 RX ADMIN — CEFAZOLIN SODIUM SCH MLS/HR: 1 SOLUTION INTRAVENOUS at 08:26

## 2018-08-18 RX ADMIN — CEFAZOLIN SODIUM SCH MLS/HR: 1 SOLUTION INTRAVENOUS at 16:21

## 2018-08-18 RX ADMIN — INSULIN LISPRO SCH UNITS: 100 INJECTION, SOLUTION INTRAVENOUS; SUBCUTANEOUS at 12:32

## 2018-08-18 RX ADMIN — METOPROLOL SUCCINATE SCH MG: 25 TABLET, EXTENDED RELEASE ORAL at 08:22

## 2018-08-18 RX ADMIN — CEFAZOLIN SODIUM SCH MLS/HR: 1 SOLUTION INTRAVENOUS at 00:06

## 2018-08-18 RX ADMIN — ENOXAPARIN SODIUM SCH: 40 INJECTION SUBCUTANEOUS at 08:33

## 2018-08-18 NOTE — CARD
--------------- APPROVED REPORT --------------





Date of service: 08/17/2018



EXAM: Two-dimensional and M-mode echocardiogram with Doppler and 

color Doppler.



Other Information 

Quality : GoodRhythm : NSR



INDICATION

Abnormal EKG/Arrhythmia SVT



2D DIMENSIONS 

IVSd0.97   (0.7-1.1cm)LVDd4.28   (3.9-5.9cm)

LVOT Diameter2.14   (1.8-2.4cm)PWd1.10   (0.7-1.1cm)

IVSs1.58   (0.8-1.2cm)LVDs2.20   (2.5-4.0cm)

FS (%) 48.6   %PWs1.52   (0.8-1.2cm)



M-Mode DIMENSIONS 

Left Atrium (MM)3.28   (2.5-4.0cm)IVSd0.83   (0.7-1.1cm)

Aortic Root3.06   (2.2-3.7cm)LVDd5.60   (4.0-5.6cm)

Aortic Cusp Exc.1.74   (1.5-2.0cm)PWd0.88   (0.7-1.1cm)

IVSs1.43   cmFS (%) 42   %

LVDs3.23   (2.0-3.8cm)PWs1.05   cm



Aortic Valve

AoV Peak Kmbrpwik397.6cm/sAoV VTI27.5cmAO Peak GR.9mmHg

LVOT Peak Ajfyousd91.2cm/sLVOT VTI18.22cmAO Mean GR.5mmHg



Mitral Valve

MV E Xxpnvxln32.8cm/sMV DECEL [id number] A Qvpydtnp616.4cm/s

MV HWW61evW/A ratio0.8MVA (PHT)2.86cm2



TDI

Lateral E' Peak V9.94cm/sMedial E' Peak V9.04cm/sE/Lateral E'8.7

E/Medial E'9.6



Pulmonary Valve

PV Peak Ocvtvjmp875.6cm/s



Tricuspid Valve

TR Peak [id number]/sRAP [id number] Peak Gr.30mmHg

[id number]



 LEFT VENTRICLE 

The left ventricle is normal size.

There is normal left ventricular wall thickness.

The left ventricular ejection fraction is within the normal range.

The Ejection Fraction is 60-65%.

No regional wall motion abnormalities noted..

Transmitral Doppler flow pattern is Grade I-abnormal relaxation 

pattern.

No left ventricle thrombus noted on this study.

There is no ventricular septal defect visualized.

There is no mass noted in the left ventricle.



 RIGHT VENTRICLE 

The right ventricle is normal size.

There is normal right ventricular wall thickness.

The right ventricular systolic function is normal.



 ATRIA 

The left atrium size is normal.

The right atrium size is normal.

The interatrial septum is intact with no evidence for an atrial 

septal defect.



 AORTIC VALVE 

The aortic valve is normal in structure.

No aortic regurgitation is present.

There is no aortic valvular stenosis.



 MITRAL VALVE 

The mitral valve is normal in structure.

There is no mitral valve stenosis.

There is mild mitral valve regurgitation noted.



 TRICUSPID VALVE 

The tricuspid valve is normal in structure.

There is moderate tricuspid valve regurgitation 

There is mild pulmonary hypertension.



 PULMONIC VALVE 

The pulmonary valve is normal in structure.

There is no pulmonic valvular regurgitation.



 GREAT VESSELS 

The aortic root is normal in size.

The ascending aorta is normal in size.

The pulmonary artery is normal.

The IVC is normal in size and collapses >50% with inspiration.



 PERICARDIAL EFFUSION 

There is no pericardial effusion.



<Conclusion>

Mild mitral insufficiency

Moderate TR with mild pulmoanry hypertension

Normal LV systolic function with doppler hemodynamics consistent with 

abnormal relaxation

The Ejection Fraction is 60-65%.

## 2018-08-18 NOTE — CP.PCM.PN
Subjective





- Date & Time of Evaluation


Date of Evaluation: 08/18/18


Time of Evaluation: 10:20





- Subjective


Subjective: 





Continues to have elevated FBS.


Has no fever.


Has no chest pain or SOB.


Continues to have elevated BP.


On Losartan and metoprolol





Objective





- Vital Signs/Intake and Output


Vital Signs (last 24 hours): 


 











Temp Pulse Resp BP Pulse Ox


 


 98.2 F   69   19   179/77 H  99 


 


 08/18/18 19:43  08/18/18 19:43  08/18/18 19:43  08/18/18 19:43  08/18/18 19:43











- Medications


Medications: 


 Current Medications





Aspirin (Aspirin Chewable)  81 mg PO DAILY [facility]


   Last Admin: 08/18/18 08:20 Dose:  81 mg


Atorvastatin Calcium (Lipitor)  20 mg PO HS [facility]


   Last Admin: 08/18/18 21:24 Dose:  20 mg


Dextrose (Dextrose 50% Inj)  0 ml IV STAT PRN; Protocol


   PRN Reason: Hypoglycemia Protocol


Dextrose (Glutose 15)  0 gm PO ONCE PRN; Protocol


   PRN Reason: Hypoglycemia Protocol


Donepezil HCl (Aricept)  10 mg PO DAILY [facility]


   Last Admin: 08/18/18 08:21 Dose:  10 mg


Enoxaparin Sodium (Lovenox)  40 mg SC DAILY [facility]


   PRN Reason: Protocol


   Last Admin: 08/18/18 08:33 Dose:  Not Given


Fenofibrate (Tricor)  145 mg PO HS [facility]


   Last Admin: 08/18/18 21:24 Dose:  145 mg


Glipizide (Glucotrol Xl)  5 mg PO BRK [facility]


Glucagon (Glucagen Diagnostic Kit)  0 mg IM STAT PRN; Protocol


   PRN Reason: Hypoglycemia Protocol


Cefazolin Sodium/Dextrose (Ancef Iv 1 Gm Duplex)  1 gm in 50 mls @ 50 mls/hr 

IVPB Q8 [facility]


   PRN Reason: Protocol


   Last Admin: 08/18/18 16:21 Dose:  50 mls/hr


Insulin Human Lispro (Humalog)  0 units SC UPON ADM [facility]


   PRN Reason: Protocol


   Last Admin: 08/18/18 12:32 Dose:  2 units


Lactic Acid (Lac-Hydrin 12% Lotion (225 G))  1 applic TOP DAILY [facility]


   Last Admin: 08/18/18 08:22 Dose:  1 applic


Losartan Potassium (Cozaar)  100 mg PO DAILY [facility]


   Last Admin: 08/18/18 12:31 Dose:  100 mg


Memantine (Namenda)  10 mg PO BID [facility]


   Last Admin: 08/18/18 16:28 Dose:  10 mg


[facility])  [facility]


Metoprolol Succinate (Toprol Xl)  25 mg PO DAILY [facility]


   Last Admin: 08/18/18 08:22 Dose:  25 mg


Quetiapine Fumarate (Seroquel)  25 mg PO HS [facility]


   Last Admin: 08/18/18 21:24 Dose:  25 mg


Sitagliptin Phosphate (Januvia)  100 mg PO DAILY [facility]


   Last Admin: 08/18/18 08:22 Dose:  100 mg











- Labs


Labs: 


 





 08/15/18 18:45 





 08/15/18 18:45 





 











PT  12.9 Seconds (9.8-13.1)   08/15/18  18:45    


 


INR  1.2   08/15/18  18:45    


 


APTT  33.0 Seconds (25.6-37.1)   08/15/18  18:45    














- Head Exam


Head Exam: NORMAL INSPECTION





- Eye Exam


Eye Exam: Normal appearance





- ENT Exam


ENT Exam: Mucous Membranes Moist





- Respiratory Exam


Respiratory Exam: Clear to Ausculation Bilateral





- Cardiovascular Exam


Cardiovascular Exam: REGULAR RHYTHM





- GI/Abdominal Exam


GI & Abdominal Exam: Normal Bowel Sounds





- Neurological Exam


Neurological Exam: CN II-XII Intact, Oriented x3





- Psychiatric Exam


Psychiatric exam: Normal Mood





Assessment and Plan


(1) Cellulitis of arm


Status: Acute   





(2) SVT (supraventricular tachycardia)


Status: Acute   





(3) DM2 (diabetes mellitus, type 2)


Status: Chronic   





(4) Dementia


Status: Chronic   





(5) Hypertension


Status: Chronic   





- Assessment and Plan (Free Text)


Plan: 





Cont meds


 Cont IV antibiotics


 adjust meds


 start PT

## 2018-08-19 RX ADMIN — CEFAZOLIN SODIUM SCH MLS/HR: 1 SOLUTION INTRAVENOUS at 01:08

## 2018-08-19 RX ADMIN — GLIPIZIDE SCH MG: 5 TABLET, EXTENDED RELEASE ORAL at 09:17

## 2018-08-19 RX ADMIN — INSULIN LISPRO SCH UNITS: 100 INJECTION, SOLUTION INTRAVENOUS; SUBCUTANEOUS at 12:55

## 2018-08-19 RX ADMIN — ENOXAPARIN SODIUM SCH: 40 INJECTION SUBCUTANEOUS at 09:18

## 2018-08-19 NOTE — CP.PCM.PN
Subjective





- Date & Time of Evaluation


Date of Evaluation: 08/19/18


Time of Evaluation: 14:00





- Subjective


Subjective: 





Patient is much more stable


 Still noted some elevation of BP


Has no chest pain or SOB


 Afebrile,


 Swelling and redness of left hand is resolved.





Objective





- Vital Signs/Intake and Output


Vital Signs (last 24 hours): 


 











Temp Pulse Resp BP Pulse Ox


 


 97.6 F   92 H  18   162/79 H  96 


 


 08/19/18 17:13  08/19/18 17:13  08/19/18 17:13  08/19/18 17:13  08/19/18 17:13











- Medications


Medications: 


 Current Medications





Amlodipine Besylate (Norvasc)  5 mg PO DAILY [facility]


   Last Admin: 08/19/18 09:16 Dose:  5 mg


Aspirin (Aspirin Chewable)  81 mg PO DAILY [facility]


   Last Admin: 08/19/18 09:17 Dose:  81 mg


Atorvastatin Calcium (Lipitor)  20 mg PO HS [facility]


   Last Admin: 08/18/18 21:24 Dose:  20 mg


Dextrose (Dextrose 50% Inj)  0 ml IV STAT PRN; Protocol


   PRN Reason: Hypoglycemia Protocol


Dextrose (Glutose 15)  0 gm PO ONCE PRN; Protocol


   PRN Reason: Hypoglycemia Protocol


Donepezil HCl (Aricept)  10 mg PO DAILY [facility]


   Last Admin: 08/19/18 09:17 Dose:  10 mg


Fenofibrate (Tricor)  145 mg PO HS [facility]


   Last Admin: 08/18/18 21:24 Dose:  145 mg


Glipizide (Glucotrol Xl)  5 mg PO BRK [facility]


   Last Admin: 08/19/18 09:17 Dose:  5 mg


Glucagon (Glucagen Diagnostic Kit)  0 mg IM STAT PRN; Protocol


   PRN Reason: Hypoglycemia Protocol


Cefazolin Sodium 1 gm/ Sodium (Chloride)  100 mls @ 100 mls/hr IVPB Q8 [facility]


   PRN Reason: Protocol


   Last Admin: 08/19/18 17:03 Dose:  100 mls/hr


Insulin Detemir (Levemir)  10 units SC HS [facility]


Insulin Human Lispro (Humalog)  0 units SC UPON ADM [facility]


   PRN Reason: Protocol


   Last Admin: 08/19/18 12:55 Dose:  2 units


Lactic Acid (Lac-Hydrin 12% Lotion (225 G))  1 applic TOP DAILY [facility]


   Last Admin: 08/19/18 09:15 Dose:  1 applic


Losartan Potassium (Cozaar)  100 mg PO DAILY [facility]


   Last Admin: 08/19/18 09:18 Dose:  100 mg


Memantine (Namenda)  10 mg PO BID [facility]


   Last Admin: 08/19/18 17:05 Dose:  10 mg


Metformin HCl (Glucophage)  500 mg PO BIDWM [facility]


   Last Admin: 08/19/18 17:05 Dose:  500 mg


Metoprolol Tartrate (Lopressor)  50 mg PO Q12 [facility]


Quetiapine Fumarate (Seroquel)  25 mg PO HS [facility]


   Last Admin: 08/18/18 21:24 Dose:  25 mg


Sitagliptin Phosphate (Januvia)  100 mg PO DAILY [facility]


   Last Admin: 08/19/18 09:15 Dose:  100 mg











- Labs


Labs: 


 





 08/15/18 18:45 





 08/15/18 18:45 





 











PT  12.9 Seconds (9.8-13.1)   08/15/18  18:45    


 


INR  1.2   08/15/18  18:45    


 


APTT  33.0 Seconds (25.6-37.1)   08/15/18  18:45    














- Head Exam


Head Exam: NORMAL INSPECTION





- Eye Exam


Eye Exam: Normal appearance





- ENT Exam


ENT Exam: Mucous Membranes Moist





- Respiratory Exam


Respiratory Exam: Clear to Ausculation Bilateral





- Cardiovascular Exam


Cardiovascular Exam: REGULAR RHYTHM





- GI/Abdominal Exam


GI & Abdominal Exam: Normal Bowel Sounds





- Neurological Exam


Neurological Exam: Awake, Oriented x3





Assessment and Plan


(1) Cellulitis of arm


Status: Acute   





(2) SVT (supraventricular tachycardia)


Status: Acute   





(3) DM2 (diabetes mellitus, type 2)


Status: Chronic   





(4) Dementia


Status: Chronic   





(5) Hypertension


Status: Chronic   





- Assessment and Plan (Free Text)


Plan: 





Cont meds


Con ttx


Cont PT


increase metoprolol to 50 bid


 cont iv antibiotics


 DC plans


 subacute rehab

## 2018-08-19 NOTE — CP.PCM.PN
Subjective





- Date & Time of Evaluation


Date of Evaluation: 08/19/18


Time of Evaluation: 08:00





- Subjective


Subjective: 





swelling and redness less





Objective





- Vital Signs/Intake and Output


Vital Signs (last 24 hours): 


 











Temp Pulse Resp BP Pulse Ox


 


 98.4 F   71   20   129/78   96 


 


 08/19/18 12:00  08/19/18 12:00  08/19/18 12:00  08/19/18 12:00  08/19/18 12:00











- Medications


Medications: 


 Current Medications





Amlodipine Besylate (Norvasc)  5 mg PO DAILY [facility]


   Last Admin: 08/19/18 09:16 Dose:  5 mg


Aspirin (Aspirin Chewable)  81 mg PO DAILY [facility]


   Last Admin: 08/19/18 09:17 Dose:  81 mg


Atorvastatin Calcium (Lipitor)  20 mg PO HS [facility]


   Last Admin: 08/18/18 21:24 Dose:  20 mg


Dextrose (Dextrose 50% Inj)  0 ml IV STAT PRN; Protocol


   PRN Reason: Hypoglycemia Protocol


Dextrose (Glutose 15)  0 gm PO ONCE PRN; Protocol


   PRN Reason: Hypoglycemia Protocol


Donepezil HCl (Aricept)  10 mg PO DAILY [facility]


   Last Admin: 08/19/18 09:17 Dose:  10 mg


Fenofibrate (Tricor)  145 mg PO HS [facility]


   Last Admin: 08/18/18 21:24 Dose:  145 mg


Glipizide (Glucotrol Xl)  5 mg PO BRK [facility]


   Last Admin: 08/19/18 09:17 Dose:  5 mg


Glucagon (Glucagen Diagnostic Kit)  0 mg IM STAT PRN; Protocol


   PRN Reason: Hypoglycemia Protocol


Cefazolin Sodium 1 gm/ Sodium (Chloride)  100 mls @ 100 mls/hr IVPB Q8 [facility]


   PRN Reason: Protocol


Insulin Detemir (Levemir)  5 units SC HS [facility]


   Last Admin: 08/18/18 23:26 Dose:  5 units


Insulin Human Lispro (Humalog)  0 units SC UPON ADM [facility]


   PRN Reason: Protocol


   Last Admin: 08/19/18 12:55 Dose:  2 units


Lactic Acid (Lac-Hydrin 12% Lotion (225 G))  1 applic TOP DAILY [facility]


   Last Admin: 08/19/18 09:15 Dose:  1 applic


Losartan Potassium (Cozaar)  100 mg PO DAILY [facility]


   Last Admin: 08/19/18 09:18 Dose:  100 mg


Memantine (Namenda)  10 mg PO BID [facility]


   Last Admin: 08/19/18 09:15 Dose:  10 mg


Metformin HCl (Glucophage)  500 mg PO BIDWM [facility]


   Last Admin: 08/19/18 09:15 Dose:  500 mg


Metoprolol Succinate (Toprol Xl)  50 mg PO DAILY [facility]


   Last Admin: 08/19/18 09:16 Dose:  50 mg


Quetiapine Fumarate (Seroquel)  25 mg PO HS [facility]


   Last Admin: 08/18/18 21:24 Dose:  25 mg


Sitagliptin Phosphate (Januvia)  100 mg PO DAILY [facility]


   Last Admin: 08/19/18 09:15 Dose:  100 mg











- Labs


Labs: 


 





 08/15/18 18:45 





 08/15/18 18:45 





 











PT  12.9 Seconds (9.8-13.1)   08/15/18  18:45    


 


INR  1.2   08/15/18  18:45    


 


APTT  33.0 Seconds (25.6-37.1)   08/15/18  18:45    














- Constitutional


Appears: Confused, Chronically Ill





- Head Exam


Head Exam: NORMOCEPHALIC





- Eye Exam


Eye Exam: PERRL





- ENT Exam


ENT Exam: Mucous Membranes Dry





- Neck Exam


Neck Exam: absent: Lymphadenopathy





- Respiratory Exam


Respiratory Exam: Decreased Breath Sounds





- Cardiovascular Exam


Cardiovascular Exam: REGULAR RHYTHM





- GI/Abdominal Exam


GI & Abdominal Exam: Distended





- Rectal Exam


Rectal Exam: Deferred





-  Exam


 Exam: NORMAL INSPECTION





- Extremities Exam


Extremities Exam: absent: Pedal Edema





- Back Exam


Back Exam: absent: CVA tenderness (L), CVA tenderness (R)





- Neurological Exam


Neurological Exam: Altered





Assessment and Plan


(1) Cellulitis of arm


Status: Acute   





(2) Left arm swelling


Status: Acute   





(3) SVT (supraventricular tachycardia)


Status: Acute   





- Assessment and Plan (Free Text)


Assessment: 





cont iv antibiotics

## 2018-08-20 VITALS — RESPIRATION RATE: 20 BRPM

## 2018-08-20 VITALS
TEMPERATURE: 97.7 F | DIASTOLIC BLOOD PRESSURE: 90 MMHG | OXYGEN SATURATION: 98 % | HEART RATE: 74 BPM | SYSTOLIC BLOOD PRESSURE: 165 MMHG

## 2018-08-20 RX ADMIN — INSULIN LISPRO SCH: 100 INJECTION, SOLUTION INTRAVENOUS; SUBCUTANEOUS at 12:27

## 2018-08-20 RX ADMIN — GLIPIZIDE SCH MG: 5 TABLET, EXTENDED RELEASE ORAL at 08:50

## 2018-08-21 NOTE — PQF
PROVIDER RESPONSE TEXT:



Diabetes with hyperglycemia



REVIEWER QUERY TEXT:



Diabetic Associated Manifestations



Please specify any manifestations associated / due to diabetes

Such as:

-- Diabetes with hyperglycemia

-- Diabetes with hypoglycemia

-- Diabetes with renal manifestation

-- Diabetes with neurologic manifestation

-- Diabetes with ophthalmic manifestation

-- Diabetes with peripheral circulatory manifestation

-- Other, please specify



The patient's Clinical Indicators include:

Documentation of DM II.



Glucose runnin--343



Rx: Glucotrol XL, Metformin, Januvia, Levemir Insulin





Query created by: Radha Case on 2018 11:03 AM















Electronically signed by:  Bradley Mora MD 2018 12:54 PM

## 2018-08-21 NOTE — PQF
PROVIDER RESPONSE TEXT:



Dementia with behavioral disturbance and impulsivity



REVIEWER QUERY TEXT:



Dementia Type and Associated Features



Dementia is documented in the Medical Record. Please specify any associated features such as with or 
without behavioral disturbance.





The patient's Clinical Indicators include:





Medication: Aricept, Namenda, Seroquel





Query created by: Radha Case on 8/20/2018 10:59 AM





Electronically signed by:  Bradley Mora MD 8/21/2018 12:54 PM

## 2018-11-14 ENCOUNTER — HOSPITAL ENCOUNTER (EMERGENCY)
Dept: HOSPITAL 14 - H.ER | Age: 78
Discharge: HOME | End: 2018-11-14
Payer: MEDICARE

## 2018-11-14 VITALS — DIASTOLIC BLOOD PRESSURE: 68 MMHG | RESPIRATION RATE: 18 BRPM | SYSTOLIC BLOOD PRESSURE: 138 MMHG | HEART RATE: 90 BPM

## 2018-11-14 VITALS — BODY MASS INDEX: 29 KG/M2

## 2018-11-14 VITALS — TEMPERATURE: 98.4 F | OXYGEN SATURATION: 97 %

## 2018-11-14 DIAGNOSIS — E11.9: ICD-10-CM

## 2018-11-14 DIAGNOSIS — G30.9: ICD-10-CM

## 2018-11-14 DIAGNOSIS — Z86.73: ICD-10-CM

## 2018-11-14 DIAGNOSIS — Z79.84: ICD-10-CM

## 2018-11-14 DIAGNOSIS — F02.80: ICD-10-CM

## 2018-11-14 DIAGNOSIS — M62.81: Primary | ICD-10-CM

## 2018-11-14 DIAGNOSIS — E78.00: ICD-10-CM

## 2018-11-14 DIAGNOSIS — I10: ICD-10-CM

## 2018-11-14 LAB
ALBUMIN SERPL-MCNC: 4.3 G/DL (ref 3.5–5)
ALBUMIN/GLOB SERPL: 1 {RATIO} (ref 1–2.1)
ALT SERPL-CCNC: 17 U/L (ref 21–72)
AST SERPL-CCNC: 22 U/L (ref 17–59)
BASOPHILS # BLD AUTO: 0 K/UL (ref 0–0.2)
BASOPHILS NFR BLD: 0.4 % (ref 0–2)
BILIRUB UR-MCNC: NEGATIVE MG/DL
BUN SERPL-MCNC: 36 MG/DL (ref 9–20)
CALCIUM SERPL-MCNC: 9.6 MG/DL (ref 8.4–10.2)
COLOR UR: YELLOW
EOSINOPHIL # BLD AUTO: 0 K/UL (ref 0–0.7)
EOSINOPHIL NFR BLD: 0.2 % (ref 0–4)
ERYTHROCYTE [DISTWIDTH] IN BLOOD BY AUTOMATED COUNT: 16.4 % (ref 11.5–14.5)
GFR NON-AFRICAN AMERICAN: 49
GLUCOSE UR STRIP-MCNC: (no result) MG/DL
HGB BLD-MCNC: 11.5 G/DL (ref 12–18)
LEUKOCYTE ESTERASE UR-ACNC: (no result) LEU/UL
LYMPHOCYTES # BLD AUTO: 0.8 K/UL (ref 1–4.3)
LYMPHOCYTES NFR BLD AUTO: 12.9 % (ref 20–40)
MCH RBC QN AUTO: 24.8 PG (ref 27–31)
MCHC RBC AUTO-ENTMCNC: 32.4 G/DL (ref 33–37)
MCV RBC AUTO: 76.5 FL (ref 80–94)
MONOCYTES # BLD: 0.3 K/UL (ref 0–0.8)
MONOCYTES NFR BLD: 5.1 % (ref 0–10)
NEUTROPHILS # BLD: 4.8 K/UL (ref 1.8–7)
NEUTROPHILS NFR BLD AUTO: 81.4 % (ref 50–75)
NRBC BLD AUTO-RTO: 0.1 % (ref 0–0)
PH UR STRIP: 5 [PH] (ref 5–8)
PLATELET # BLD: 146 K/UL (ref 130–400)
PMV BLD AUTO: 9.4 FL (ref 7.2–11.7)
PROT UR STRIP-MCNC: NEGATIVE MG/DL
RBC # BLD AUTO: 4.64 MIL/UL (ref 4.4–5.9)
RBC # UR STRIP: NEGATIVE /UL
SP GR UR STRIP: 1.02 (ref 1–1.03)
SQUAMOUS EPITHIAL: < 1 /HPF (ref 0–5)
URINE CLARITY: CLEAR
UROBILINOGEN UR-MCNC: (no result) MG/DL (ref 0.2–1)
WBC # BLD AUTO: 5.9 K/UL (ref 4.8–10.8)

## 2018-11-14 PROCEDURE — 87086 URINE CULTURE/COLONY COUNT: CPT

## 2018-11-14 PROCEDURE — 85025 COMPLETE CBC W/AUTO DIFF WBC: CPT

## 2018-11-14 PROCEDURE — 71045 X-RAY EXAM CHEST 1 VIEW: CPT

## 2018-11-14 PROCEDURE — 80053 COMPREHEN METABOLIC PANEL: CPT

## 2018-11-14 PROCEDURE — 81003 URINALYSIS AUTO W/O SCOPE: CPT

## 2018-11-14 PROCEDURE — 82948 REAGENT STRIP/BLOOD GLUCOSE: CPT

## 2018-11-14 PROCEDURE — 73110 X-RAY EXAM OF WRIST: CPT

## 2018-11-14 PROCEDURE — 70450 CT HEAD/BRAIN W/O DYE: CPT

## 2018-11-14 PROCEDURE — 99285 EMERGENCY DEPT VISIT HI MDM: CPT

## 2018-11-14 NOTE — RAD
Date of service: 



11/14/2018



PROCEDURE:  Left Wrist Radiographs.







HISTORY:

pain, atraumatic



COMPARISON:

None.



FINDINGS:



BONES:

There is periarticular bone demineralization.  No acute fracture or 

bone destruction.  Bone alignment is normal. 



JOINTS:

Normal. No dislocation. 



SOFT TISSUES:

Normal. 



OTHER FINDINGS:

None.



IMPRESSION:

No acute fracture, dislocation or bone destruction.

## 2018-11-14 NOTE — RAD
Date of service: 



11/14/2018



HISTORY:

 gen weakness 



COMPARISON:

12/29/2016 



FINDINGS:



LUNGS:

Shallow lung volumes.  No consolidation.



PLEURA:

No significant pleural effusion identified, no pneumothorax apparent.



CARDIOVASCULAR:

There is presence of aortic atherosclerotic calcification on x-ray.



Minimal cardiomegaly probable mild pulmonary venous congestion 

(versus accentuation from crowding) 



OSSEOUS STRUCTURES:

No significant abnormalities.



VISUALIZED UPPER ABDOMEN:

Normal.



OTHER FINDINGS:

None.



IMPRESSION:

Shallow inspiration-accentuating pulmonary vasculature.  Mild 

pulmonary venous congestion is a consideration.  Mild cardiomegaly 

present



No infiltrate appreciated

## 2018-11-14 NOTE — CT
Date of service: 



11/14/2018



PROCEDURE:  CT HEAD WITHOUT CONTRAST.



HISTORY:

headache



COMPARISON:

12/29/2016



TECHNIQUE:

Axial computed tomography images were obtained through the head/brain 

without intravenous contrast.  



Radiation dose:



Total exam DLP = 791.84 mGy-cm.



This CT exam was performed using one or more of the following dose 

reduction techniques: Automated exposure control, adjustment of the 

mA and/or kV according to patient size, and/or use of iterative 

reconstruction technique.



FINDINGS:



HEMORRHAGE:

No intracranial hemorrhage. 



BRAIN:

Gray-white matter differentiation is preserved.  There is no mass, 

mass effect or abnormal extra-axial fluid collection.  There is no 

territorial infarction. The midline sagittal structures are normal.  

Senile bilateral basal ganglia calcifications. 



VENTRICLES:

There is moderate age-related global parenchymal volume loss and 

proportionate enlargement of the ventricles and cortical sulci.  Mild 

asymmetry in the size of the lateral ventricles, left larger than 

right, an anatomic variant. 



CALVARIUM:

There is no calvarial fracture or extracranial soft tissue swelling.



PARANASAL SINUSES:

Predominantly clear.



MASTOID AIR CELLS:

Predominantly clear.



OTHER FINDINGS:

None.



IMPRESSION:

No acute intracranial abnormality.



Moderate age-related global parenchymal volume loss.

## 2018-11-14 NOTE — ED PDOC
HPI: General Adult


Time Seen by Provider: 11/14/18 11:39


Chief Complaint (Nursing): Weakness/Neurological Deficit


Chief Complaint (Provider): Weakness/Neurological Deficit


History Per: Family


History/Exam Limitations: no limitations


Onset/Duration Of Symptoms: Days


Current Symptoms Are (Timing): Still Present


Additional Complaint(s): 


Burton Burden is an 78 year old male with a past medical history of 

hypercholesterolemia, hypertension, diabetes, Alzheimers, and CVA who was 

brought to the ED by daughter for evaluation. Daughter states that yesterday 

patient had left wrist pain and today he was unable to stand up straight and 

walk. She also noted that patient was trembling and reports that he eats and 

drinks very little. 





Of note, patient takes mood stabilizers. 





PMD: An [place]











Past Medical History


Reviewed: Historical Data, Nursing Documentation, Vital Signs


Vital Signs: 





                                Last Vital Signs











Temp  98.4 F   11/14/18 11:17


 


Pulse  104 H  11/14/18 11:17


 


Resp  20   11/14/18 11:17


 


BP  134/71   11/14/18 11:17


 


Pulse Ox  97   11/14/18 11:17














- Medical History


PMH: Alzheimer's Disease, Dementia, Diabetes, HTN, Hypercholesterolemia, 

Hyperlipidemia, TIA


   Denies: Arthritis, Bronchitis, Chronic Kidney Disease





- Surgical History


Other surgeries: prostate surgery





- Family History


Family History: States: Unknown Family Hx





- Social History


Current smoker - smoking cessation education provided: No


Ex-Smoker (has not smoked in the last 12 months): Yes


Alcohol: None


Drugs: Denies





- Immunization History


Hx Tetanus Toxoid Vaccination: No


Hx Influenza Vaccination: Yes


Hx Pneumococcal Vaccination: No





- Home Medications


Home Medications: 


                                Ambulatory Orders











 Medication  Instructions  Recorded


 


Donepezil [Aricept] 10 mg PO HS 12/29/16


 


Memantine [Namenda] 10 mg PO Q12 12/29/16


 


Simvastatin [Zocor] 40 mg PO HS 12/29/16


 


Aspirin [Aspirin Chewable] 81 mg PO DAILY #30 ctb 12/30/16


 


QUEtiapine [Seroquel] 25 mg PO HS 08/15/18


 


Divalproex [Depakote Sprinkles] 250 mg PO DAILY 09/05/18


 


Folic Acid 1 mg PO DAILY 09/05/18


 


Isoniazid [Niazid] 300 mg PO DAILY 09/05/18


 


Omeprazole 20 mg PO DAILY 09/05/18


 


amLODIPine [Norvasc] 5 mg PO DAILY 09/05/18


 


metFORMIN [glucOPHAGE] 500 mg PO BID #60 tab 09/05/18














- Allergies


Allergies/Adverse Reactions: 


                                    Allergies











Allergy/AdvReac Type Severity Reaction Status Date / Time


 


No Known Allergies Allergy   Verified 08/15/18 17:02














Review of Systems


ROS Statement: Except As Marked, All Systems Reviewed And Found Negative


Musculoskeletal: Positive for: Hand Pain


Neurological: Positive for: Weakness, Other (trembles )





Physical Exam





- Reviewed


Nursing Documentation Reviewed: Yes


Vital Signs Reviewed: Yes





- Physical Exam


Appears: Positive for: Non-toxic, No Acute Distress


Head Exam: Positive for: ATRAUMATIC, NORMAL INSPECTION, NORMOCEPHALIC


Skin: Positive for: Normal Color, Warm, DRY


Eye Exam: Positive for: EOMI, Normal appearance, PERRL


ENT: Positive for: Other (dry mucous membranes)


Neck: Positive for: Normal, Painless ROM


Cardiovascular/Chest: Positive for: Regular Rate, Rhythm.  Negative for: Murmur


Respiratory: Positive for: Normal Breath Sounds.  Negative for: Respiratory 

Distress


Gastrointestinal/Abdominal: Positive for: Normal Exam, Soft.  Negative for: 

Tenderness


Back: Positive for: Normal Inspection


Extremity: Positive for: Normal ROM, Other (moving all 4 extremities with equal 

strength).  Negative for: Deformity, Swelling


Neurologic/Psych: Positive for: Alert, Oriented (x1: his name), Other (follows 

some commands).  Negative for: Motor/Sensory Deficits





- Laboratory Results


Result Diagrams: 


                                 11/14/18 12:09





                                 11/14/18 12:09





- ECG


O2 Sat by Pulse Oximetry: 97 (RA)


Pulse Ox Interpretation: Normal





Medical Decision Making


Medical Decision Making: 


Time: 12:16


Plan:


--CMP


--CBC


--IV Fluids


--Urine Culture


--Urinalysis


--Chest X-Ray





Chest X-Ray:





FINDINGS:





LUNGS:


Shallow lung volumes.  No consolidation.





PLEURA:


No significant pleural effusion identified, no pneumothorax apparent.





CARDIOVASCULAR:


There is presence of aortic atherosclerotic calcification on x-ray.





Minimal cardiomegaly probable mild pulmonary venous congestion (versus 

accentuation from crowding) 





OSSEOUS STRUCTURES:


No significant abnormalities.





VISUALIZED UPPER ABDOMEN:


Normal.





OTHER FINDINGS:


None.





IMPRESSION:


Shallow inspiration-accentuating pulmonary vasculature.  Mild pulmonary venous 

congestion is a consideration.  Mild cardiomegaly present





No infiltrate appreciated





15:40





Patient ate food in the ED. Las were reviewed with no clinically significant 

abnormalities. 





CT Head:





FINDINGS:





HEMORRHAGE:


No intracranial hemorrhage. 





BRAIN:


Gray-white matter differentiation is preserved.  There is no mass, mass effect 

or abnormal extra-axial fluid collection.  There is no territorial infarction. 

The midline sagittal structures are normal.  Senile bilateral basal ganglia 

calcifications. 





VENTRICLES:


There is moderate age-related global parenchymal volume loss and proportionate 

enlargement of the ventricles and cortical sulci.  Mild asymmetry in the size of

 the lateral ventricles, left larger than right, an anatomic variant. 





CALVARIUM:


There is no calvarial fracture or extracranial soft tissue swelling.





PARANASAL SINUSES:


Predominantly clear.





MASTOID AIR CELLS:


Predominantly clear.





OTHER FINDINGS:


None.





IMPRESSION:


No acute intracranial abnormality.





Moderate age-related global parenchymal volume loss.








Wrist X-Ray:





FINDINGS:





BONES:


There is periarticular bone demineralization.  No acute fracture or bone 

destruction.  Bone alignment is normal. 





JOINTS:


Normal. No dislocation. 





SOFT TISSUES:


Normal. 





OTHER FINDINGS:


None.





IMPRESSION:


No acute fracture, dislocation or bone destruction.





16:55





Patient is feeling much better with no acute findings in imaging. He is 

medically stable for discharge and daughter will take patient home. 





--------------------------------------------------------------------------------

----------------- 


Scribe Attestation:


Documented by, Talia Kaye acting as a scribe for Yeny Baldwin MD.


 


Provider Scribe Attestation:


All medical record entries made by the Scribe were at my direction and 

personally dictated by me. I have reviewed the chart and agree that the record 

accurately reflects my personal performance of the history, physical exam, 

medical decision making, and the department course for this patient. I have also

 personally directed, reviewed, and agree with the discharge instructions and 

disposition.











Disposition





- Clinical Impression


Clinical Impression: 


 Generalized muscle weakness








- Disposition


Condition: IMPROVED


Additional Instructions: 


follow up with Dr Gallo in 1-2 days


return to the ED with any worsening or concerning symptoms


Instructions:  Generalized Weakness (DC), Weakness (ED)


Forms:  [facility] (English)

## 2019-01-18 ENCOUNTER — HOSPITAL ENCOUNTER (EMERGENCY)
Dept: HOSPITAL 14 - H.ER | Age: 79
Discharge: HOME | End: 2019-01-18
Payer: MEDICARE

## 2019-01-18 VITALS — RESPIRATION RATE: 16 BRPM

## 2019-01-18 VITALS — BODY MASS INDEX: 22.1 KG/M2

## 2019-01-18 VITALS — OXYGEN SATURATION: 100 %

## 2019-01-18 VITALS — TEMPERATURE: 97.8 F

## 2019-01-18 DIAGNOSIS — R21: Primary | ICD-10-CM

## 2019-01-18 LAB
ALBUMIN SERPL-MCNC: 4.6 G/DL (ref 3.5–5)
ALBUMIN/GLOB SERPL: 1 {RATIO} (ref 1–2.1)
ALT SERPL-CCNC: 13 U/L (ref 21–72)
AST SERPL-CCNC: 23 U/L (ref 17–59)
BASOPHILS # BLD AUTO: 0 K/UL (ref 0–0.2)
BASOPHILS NFR BLD: 0.7 % (ref 0–2)
BILIRUB UR-MCNC: NEGATIVE MG/DL
BUN SERPL-MCNC: 33 MG/DL (ref 9–20)
CALCIUM SERPL-MCNC: 10.2 MG/DL (ref 8.4–10.2)
COLOR UR: YELLOW
EOSINOPHIL # BLD AUTO: 0.1 K/UL (ref 0–0.7)
EOSINOPHIL NFR BLD: 2.1 % (ref 0–4)
ERYTHROCYTE [DISTWIDTH] IN BLOOD BY AUTOMATED COUNT: 16.7 % (ref 11.5–14.5)
GFR NON-AFRICAN AMERICAN: > 60
GLUCOSE UR STRIP-MCNC: (no result) MG/DL
HGB BLD-MCNC: 13.3 G/DL (ref 12–18)
LEUKOCYTE ESTERASE UR-ACNC: (no result) LEU/UL
LYMPHOCYTES # BLD AUTO: 1.5 K/UL (ref 1–4.3)
LYMPHOCYTES NFR BLD AUTO: 31.3 % (ref 20–40)
MCH RBC QN AUTO: 25.3 PG (ref 27–31)
MCHC RBC AUTO-ENTMCNC: 32.5 G/DL (ref 33–37)
MCV RBC AUTO: 78 FL (ref 80–94)
MONOCYTES # BLD: 0.3 K/UL (ref 0–0.8)
MONOCYTES NFR BLD: 6.4 % (ref 0–10)
NEUTROPHILS # BLD: 2.8 K/UL (ref 1.8–7)
NEUTROPHILS NFR BLD AUTO: 59.5 % (ref 50–75)
NRBC BLD AUTO-RTO: 0.3 % (ref 0–0)
PH UR STRIP: 5 [PH] (ref 5–8)
PLATELET # BLD: 132 K/UL (ref 130–400)
PMV BLD AUTO: 8.9 FL (ref 7.2–11.7)
PROT UR STRIP-MCNC: 30 MG/DL
RBC # BLD AUTO: 5.27 MIL/UL (ref 4.4–5.9)
RBC # UR STRIP: NEGATIVE /UL
SP GR UR STRIP: 1.03 (ref 1–1.03)
SQUAMOUS EPITHIAL: < 1 /HPF (ref 0–5)
URINE AMORPHOUS SEDIMENT: (no result) /UL
URINE CLARITY: (no result)
UROBILINOGEN UR-MCNC: 2 MG/DL (ref 0.2–1)
WBC # BLD AUTO: 4.8 K/UL (ref 4.8–10.8)

## 2019-01-18 PROCEDURE — 99283 EMERGENCY DEPT VISIT LOW MDM: CPT

## 2019-01-18 PROCEDURE — 82948 REAGENT STRIP/BLOOD GLUCOSE: CPT

## 2019-01-18 PROCEDURE — 96372 THER/PROPH/DIAG INJ SC/IM: CPT

## 2019-01-18 PROCEDURE — 85025 COMPLETE CBC W/AUTO DIFF WBC: CPT

## 2019-01-18 PROCEDURE — 87086 URINE CULTURE/COLONY COUNT: CPT

## 2019-01-18 PROCEDURE — 80053 COMPREHEN METABOLIC PANEL: CPT

## 2019-01-18 PROCEDURE — 81003 URINALYSIS AUTO W/O SCOPE: CPT

## 2019-01-18 NOTE — ED PDOC
HPI: General Adult


Time Seen by Provider: 01/18/19 11:38


Chief Complaint (Nursing): Abnormal Skin Integrity


Chief Complaint (Provider): rash


History Per: Family (daughter who is HCP/POA)


Additional Complaint(s): 


77 y/o M with HTN, HL, DM, TIA, and Alzheimer's who presents with rash on back. 

Daughter states that patient has known Alzheimers and has been refusing to bath.

He has bathed once in the past 3 months. He was c/o back pain and she noticed a 

rash on his lower back so decided to come to ER for further evaluation. Denies 

fever, chills, night sweats, increased confusion within the past week. Daughter 

states that patient has also been refusing to eat or drink and barely drinks one

glass of water per day. His urine is very dark. 








Past Medical History


Reviewed: Historical Data, Nursing Documentation, Vital Signs


Vital Signs: 





                                Last Vital Signs











Temp  98.2 F   01/18/19 10:57


 


Pulse  85   01/18/19 10:57


 


Resp  16   01/18/19 10:57


 


BP  144/81   01/18/19 10:57


 


Pulse Ox  100   01/18/19 10:57














- Medical History


PMH: Alzheimer's Disease, Dementia, Diabetes, HTN, Hypercholesterolemia, 

Hyperlipidemia, TIA


   Denies: Arthritis, Bronchitis, Chronic Kidney Disease





- Family History


Family History: States: Unknown Family Hx





- Immunization History


Hx Tetanus Toxoid Vaccination: No


Hx Influenza Vaccination: Yes


Hx Pneumococcal Vaccination: No





- Home Medications


Home Medications: 


                                Ambulatory Orders











 Medication  Instructions  Recorded


 


Donepezil [Aricept] 10 mg PO HS 12/29/16


 


Memantine [Namenda] 10 mg PO Q12 12/29/16


 


Simvastatin [Zocor] 40 mg PO HS 12/29/16


 


Aspirin [Aspirin Chewable] 81 mg PO DAILY #30 ctb 12/30/16


 


QUEtiapine [Seroquel] 25 mg PO HS 08/15/18


 


Divalproex [Depakote Sprinkles] 250 mg PO DAILY 09/05/18


 


Folic Acid 1 mg PO DAILY 09/05/18


 


Isoniazid [Niazid] 300 mg PO DAILY 09/05/18


 


Omeprazole 20 mg PO DAILY 09/05/18


 


amLODIPine [Norvasc] 5 mg PO DAILY 09/05/18


 


metFORMIN [glucOPHAGE] 500 mg PO BID #60 tab 09/05/18


 


Clotrimazole 1% Cream [Lotrimin 1%] 1 appful TOP BID 28 Days  tube 01/18/19














- Allergies


Allergies/Adverse Reactions: 


                                    Allergies











Allergy/AdvReac Type Severity Reaction Status Date / Time


 


No Known Allergies Allergy   Verified 08/15/18 17:02














Physical Exam





- Reviewed


Nursing Documentation Reviewed: Yes


Vital Signs Reviewed: Yes





- Physical Exam


Appears: Positive for: Non-toxic


Head Exam: Positive for: ATRAUMATIC


Skin: Positive for: Normal Color, Rash (mildly erythematous confluent rash with 

scaling (overall has dry skin) on lower back in the intertriginous area between 

buttock. No purulent drainage. )


Neck: Positive for: Normal


Cardiovascular/Chest: Positive for: Regular Rate, Rhythm


Respiratory: Positive for: Normal Breath Sounds





- Laboratory Results


Result Diagrams: 


                                 01/18/19 15:10





                                 01/18/19 15:10





- ECG


O2 Sat by Pulse Oximetry: 100





Medical Decision Making


Medical Decision Making: 


CBC, CMP


U/A, urine culture


Crisis evaluation





13:28: seen by crisis, patient does not meet criteria for psych admission as 

would not be able to participate in all aspects. Patient refusing to have blood 

work drawn but does not have capacity. Daughter informed that patient will need 

to be given medications to sedate him to make him more cooperative. She is in 

agreement as she feels that he needs further evaluation. Ativan 2mg IM ordered. 





15:00: labs finally obtained after security called when patient continued to 

refuse and attempting to leave. Labs obtained, awaiting results. U/A: LE and 

nitrate negative. 





15:30: labs wnl except for mildly elevated BUN, patient somnolent and havign 

difficulty standing, Daughter informed of lab results. Will re-evaluate when 

patient able to ambulate with steady gait.  





17:00: pt ambulating around room with steady gait. Stable for d/c home. 





Disposition





- Clinical Impression


Clinical Impression: 


 Skin rash








- Patient ED Disposition


Is Patient to be Admitted: No


Counseled Patient/Family Regarding: Studies Performed, Diagnosis, Need For 

Followup, Rx Given





- Disposition


Referrals: 


An Reno MD [Family Provider] - 


Disposition: Routine/Home


Disposition Time: 17:10


Condition: STABLE


Additional Instructions: 


F/u with Dr. Reno in the next few days to discuss long term assessment of 

failure to thrive and . Return to ER if patient becomes too tired

to walk, more confused than usual or worsening symptoms. 


Prescriptions: 


Clotrimazole 1% Cream [Lotrimin 1%] 1 appful TOP BID 28 Days  tube


Instructions:  Fungal Skin Rash (DC)


Forms:  [facility] (English)


Print Language: ENGLISH

## 2019-01-19 VITALS — SYSTOLIC BLOOD PRESSURE: 128 MMHG | HEART RATE: 78 BPM | DIASTOLIC BLOOD PRESSURE: 76 MMHG
